# Patient Record
Sex: MALE | Race: WHITE | Employment: OTHER | URBAN - NONMETROPOLITAN AREA
[De-identification: names, ages, dates, MRNs, and addresses within clinical notes are randomized per-mention and may not be internally consistent; named-entity substitution may affect disease eponyms.]

---

## 2023-11-08 ENCOUNTER — HOSPITAL ENCOUNTER (INPATIENT)
Age: 88
LOS: 2 days | Discharge: HOME | End: 2023-11-10
Attending: STUDENT IN AN ORGANIZED HEALTH CARE EDUCATION/TRAINING PROGRAM
Payer: MEDICARE

## 2023-11-08 ENCOUNTER — APPOINTMENT (OUTPATIENT)
Age: 88
End: 2023-11-08
Attending: NUCLEAR MEDICINE
Payer: MEDICARE

## 2023-11-08 ENCOUNTER — APPOINTMENT (OUTPATIENT)
Dept: NUCLEAR MEDICINE | Age: 88
End: 2023-11-08
Attending: NUCLEAR MEDICINE
Payer: MEDICARE

## 2023-11-08 DIAGNOSIS — R07.9 CHEST PAIN: Primary | ICD-10-CM

## 2023-11-08 DIAGNOSIS — I20.0 UNSTABLE ANGINA (HCC): ICD-10-CM

## 2023-11-08 DIAGNOSIS — R94.39 ABNORMAL STRESS TEST: ICD-10-CM

## 2023-11-08 PROBLEM — R07.2 PRECORDIAL PAIN: Status: ACTIVE | Noted: 2023-11-08

## 2023-11-08 PROBLEM — R07.89 CHEST PRESSURE: Status: ACTIVE | Noted: 2023-11-08

## 2023-11-08 LAB
ALBUMIN SERPL BCG-MCNC: 3.6 G/DL (ref 3.5–5.1)
ALP SERPL-CCNC: 74 U/L (ref 38–126)
ALT SERPL W/O P-5'-P-CCNC: 21 U/L (ref 11–66)
ANION GAP SERPL CALC-SCNC: 13 MEQ/L (ref 8–16)
AST SERPL-CCNC: 19 U/L (ref 5–40)
BASOPHILS ABSOLUTE: 0 THOU/MM3 (ref 0–0.1)
BASOPHILS NFR BLD AUTO: 0.7 %
BILIRUB SERPL-MCNC: 0.5 MG/DL (ref 0.3–1.2)
BUN SERPL-MCNC: 23 MG/DL (ref 7–22)
CALCIUM SERPL-MCNC: 9.3 MG/DL (ref 8.5–10.5)
CHLORIDE SERPL-SCNC: 107 MEQ/L (ref 98–111)
CO2 SERPL-SCNC: 21 MEQ/L (ref 23–33)
CREAT SERPL-MCNC: 1.1 MG/DL (ref 0.4–1.2)
DEPRECATED RDW RBC AUTO: 45.2 FL (ref 35–45)
ECHO BSA: 2.19 M2
EKG ATRIAL RATE: 51 BPM
EKG P AXIS: 67 DEGREES
EKG P-R INTERVAL: 174 MS
EKG Q-T INTERVAL: 452 MS
EKG QRS DURATION: 92 MS
EKG QTC CALCULATION (BAZETT): 416 MS
EKG R AXIS: 79 DEGREES
EKG T AXIS: 73 DEGREES
EKG VENTRICULAR RATE: 51 BPM
EOSINOPHIL NFR BLD AUTO: 3.7 %
EOSINOPHILS ABSOLUTE: 0.3 THOU/MM3 (ref 0–0.4)
ERYTHROCYTE [DISTWIDTH] IN BLOOD BY AUTOMATED COUNT: 13.5 % (ref 11.5–14.5)
GFR SERPL CREATININE-BSD FRML MDRD: > 60 ML/MIN/1.73M2
GLUCOSE SERPL-MCNC: 118 MG/DL (ref 70–108)
HCT VFR BLD AUTO: 45.8 % (ref 42–52)
HGB BLD-MCNC: 15.4 GM/DL (ref 14–18)
IMM GRANULOCYTES # BLD AUTO: 0.01 THOU/MM3 (ref 0–0.07)
IMM GRANULOCYTES NFR BLD AUTO: 0.1 %
LYMPHOCYTES ABSOLUTE: 2 THOU/MM3 (ref 1–4.8)
LYMPHOCYTES NFR BLD AUTO: 28.4 %
MCH RBC QN AUTO: 30.8 PG (ref 26–33)
MCHC RBC AUTO-ENTMCNC: 33.6 GM/DL (ref 32.2–35.5)
MCV RBC AUTO: 91.6 FL (ref 80–94)
MONOCYTES ABSOLUTE: 0.8 THOU/MM3 (ref 0.4–1.3)
MONOCYTES NFR BLD AUTO: 11.2 %
NEUTROPHILS NFR BLD AUTO: 55.9 %
NRBC BLD AUTO-RTO: 0 /100 WBC
NUC REST EJECTION FRACTION: 53 %
NUC STRESS EJECTION FRACTION: 53 %
PLATELET # BLD AUTO: 147 THOU/MM3 (ref 130–400)
PMV BLD AUTO: 10.7 FL (ref 9.4–12.4)
POTASSIUM SERPL-SCNC: 4.2 MEQ/L (ref 3.5–5.2)
PROT SERPL-MCNC: 6.6 G/DL (ref 6.1–8)
RBC # BLD AUTO: 5 MILL/MM3 (ref 4.7–6.1)
SEGMENTED NEUTROPHILS ABSOLUTE COUNT: 4 THOU/MM3 (ref 1.8–7.7)
SODIUM SERPL-SCNC: 141 MEQ/L (ref 135–145)
STRESS BASELINE DIAS BP: 79 MMHG
STRESS BASELINE HR: 58 BPM
STRESS BASELINE SYS BP: 195 MMHG
STRESS STAGE 1 DURATION: 1 MIN:SEC
STRESS STAGE 1 HR: 64 BPM
STRESS STAGE 2 BP: NORMAL MMHG
STRESS STAGE 2 DURATION: 1 MIN:SEC
STRESS STAGE 2 HR: 78 BPM
STRESS STAGE 3 BP: NORMAL MMHG
STRESS STAGE 3 DURATION: 1 MIN:SEC
STRESS STAGE 3 HR: 74 BPM
STRESS STAGE RECOVERY 1 BP: NORMAL MMHG
STRESS STAGE RECOVERY 1 DURATION: 1 MIN:SEC
STRESS STAGE RECOVERY 1 HR: 74 BPM
STRESS STAGE RECOVERY 2 BP: NORMAL MMHG
STRESS STAGE RECOVERY 2 DURATION: 1 MIN:SEC
STRESS STAGE RECOVERY 2 HR: 73 BPM
STRESS STAGE RECOVERY 3 BP: NORMAL MMHG
STRESS STAGE RECOVERY 3 DURATION: 1 MIN:SEC
STRESS STAGE RECOVERY 3 HR: 68 BPM
STRESS STAGE RECOVERY 4 BP: NORMAL MMHG
STRESS STAGE RECOVERY 4 DURATION: 2 MIN:SEC
STRESS STAGE RECOVERY 4 HR: 71 BPM
STRESS TARGET HR: 134 BPM
TROPONIN, HIGH SENSITIVITY: 17 NG/L (ref 0–12)
TSH SERPL DL<=0.005 MIU/L-ACNC: 2.15 UIU/ML (ref 0.4–4.2)
WBC # BLD AUTO: 7.1 THOU/MM3 (ref 4.8–10.8)

## 2023-11-08 PROCEDURE — 2580000003 HC RX 258

## 2023-11-08 PROCEDURE — 85025 COMPLETE CBC W/AUTO DIFF WBC: CPT

## 2023-11-08 PROCEDURE — 84443 ASSAY THYROID STIM HORMONE: CPT

## 2023-11-08 PROCEDURE — 99223 1ST HOSP IP/OBS HIGH 75: CPT | Performed by: NUCLEAR MEDICINE

## 2023-11-08 PROCEDURE — 80053 COMPREHEN METABOLIC PANEL: CPT

## 2023-11-08 PROCEDURE — 93016 CV STRESS TEST SUPVJ ONLY: CPT | Performed by: NUCLEAR MEDICINE

## 2023-11-08 PROCEDURE — 36415 COLL VENOUS BLD VENIPUNCTURE: CPT

## 2023-11-08 PROCEDURE — 1200000003 HC TELEMETRY R&B

## 2023-11-08 PROCEDURE — 3430000000 HC RX DIAGNOSTIC RADIOPHARMACEUTICAL: Performed by: NUCLEAR MEDICINE

## 2023-11-08 PROCEDURE — 6360000002 HC RX W HCPCS

## 2023-11-08 PROCEDURE — 93010 ELECTROCARDIOGRAM REPORT: CPT | Performed by: INTERNAL MEDICINE

## 2023-11-08 PROCEDURE — 78452 HT MUSCLE IMAGE SPECT MULT: CPT

## 2023-11-08 PROCEDURE — 93017 CV STRESS TEST TRACING ONLY: CPT

## 2023-11-08 PROCEDURE — 93005 ELECTROCARDIOGRAM TRACING: CPT

## 2023-11-08 PROCEDURE — 84484 ASSAY OF TROPONIN QUANT: CPT

## 2023-11-08 PROCEDURE — 6360000002 HC RX W HCPCS: Performed by: NUCLEAR MEDICINE

## 2023-11-08 PROCEDURE — 93018 CV STRESS TEST I&R ONLY: CPT | Performed by: NUCLEAR MEDICINE

## 2023-11-08 PROCEDURE — 78452 HT MUSCLE IMAGE SPECT MULT: CPT | Performed by: NUCLEAR MEDICINE

## 2023-11-08 PROCEDURE — 99222 1ST HOSP IP/OBS MODERATE 55: CPT

## 2023-11-08 PROCEDURE — 6370000000 HC RX 637 (ALT 250 FOR IP)

## 2023-11-08 PROCEDURE — A9500 TC99M SESTAMIBI: HCPCS | Performed by: NUCLEAR MEDICINE

## 2023-11-08 RX ORDER — ONDANSETRON 4 MG/1
4 TABLET, ORALLY DISINTEGRATING ORAL EVERY 8 HOURS PRN
Status: DISCONTINUED | OUTPATIENT
Start: 2023-11-08 | End: 2023-11-10 | Stop reason: HOSPADM

## 2023-11-08 RX ORDER — ACETAMINOPHEN 650 MG/1
650 SUPPOSITORY RECTAL EVERY 6 HOURS PRN
Status: DISCONTINUED | OUTPATIENT
Start: 2023-11-08 | End: 2023-11-10 | Stop reason: HOSPADM

## 2023-11-08 RX ORDER — POLYETHYLENE GLYCOL 3350 17 G/17G
17 POWDER, FOR SOLUTION ORAL DAILY PRN
COMMUNITY

## 2023-11-08 RX ORDER — CLOPIDOGREL BISULFATE 75 MG/1
75 TABLET ORAL DAILY
Status: ON HOLD | COMMUNITY
End: 2023-11-10 | Stop reason: HOSPADM

## 2023-11-08 RX ORDER — POLYETHYLENE GLYCOL 3350 17 G/17G
17 POWDER, FOR SOLUTION ORAL DAILY PRN
Status: DISCONTINUED | OUTPATIENT
Start: 2023-11-08 | End: 2023-11-10 | Stop reason: HOSPADM

## 2023-11-08 RX ORDER — ATORVASTATIN CALCIUM 40 MG/1
40 TABLET, FILM COATED ORAL DAILY
Status: DISCONTINUED | OUTPATIENT
Start: 2023-11-08 | End: 2023-11-10 | Stop reason: HOSPADM

## 2023-11-08 RX ORDER — SODIUM CHLORIDE 9 MG/ML
INJECTION, SOLUTION INTRAVENOUS PRN
Status: DISCONTINUED | OUTPATIENT
Start: 2023-11-08 | End: 2023-11-10 | Stop reason: SDUPTHER

## 2023-11-08 RX ORDER — POTASSIUM CHLORIDE 20 MEQ/1
40 TABLET, EXTENDED RELEASE ORAL PRN
Status: DISCONTINUED | OUTPATIENT
Start: 2023-11-08 | End: 2023-11-10 | Stop reason: HOSPADM

## 2023-11-08 RX ORDER — CLOPIDOGREL BISULFATE 75 MG/1
75 TABLET ORAL DAILY
Status: DISCONTINUED | OUTPATIENT
Start: 2023-11-08 | End: 2023-11-09

## 2023-11-08 RX ORDER — TETRAKIS(2-METHOXYISOBUTYLISOCYANIDE)COPPER(I) TETRAFLUOROBORATE 1 MG/ML
34 INJECTION, POWDER, LYOPHILIZED, FOR SOLUTION INTRAVENOUS
Status: COMPLETED | OUTPATIENT
Start: 2023-11-08 | End: 2023-11-08

## 2023-11-08 RX ORDER — ATENOLOL 50 MG/1
50 TABLET ORAL DAILY
Status: DISCONTINUED | OUTPATIENT
Start: 2023-11-08 | End: 2023-11-10 | Stop reason: HOSPADM

## 2023-11-08 RX ORDER — ONDANSETRON 2 MG/ML
4 INJECTION INTRAMUSCULAR; INTRAVENOUS EVERY 6 HOURS PRN
Status: DISCONTINUED | OUTPATIENT
Start: 2023-11-08 | End: 2023-11-10 | Stop reason: HOSPADM

## 2023-11-08 RX ORDER — HYDRALAZINE HYDROCHLORIDE 25 MG/1
25 TABLET, FILM COATED ORAL 3 TIMES DAILY
COMMUNITY

## 2023-11-08 RX ORDER — PANTOPRAZOLE SODIUM 40 MG/1
40 TABLET, DELAYED RELEASE ORAL
Status: DISCONTINUED | OUTPATIENT
Start: 2023-11-08 | End: 2023-11-10 | Stop reason: HOSPADM

## 2023-11-08 RX ORDER — REGADENOSON 0.08 MG/ML
0.4 INJECTION, SOLUTION INTRAVENOUS
Status: COMPLETED | OUTPATIENT
Start: 2023-11-08 | End: 2023-11-08

## 2023-11-08 RX ORDER — SODIUM CHLORIDE 0.9 % (FLUSH) 0.9 %
5-40 SYRINGE (ML) INJECTION PRN
Status: DISCONTINUED | OUTPATIENT
Start: 2023-11-08 | End: 2023-11-10 | Stop reason: HOSPADM

## 2023-11-08 RX ORDER — SODIUM CHLORIDE 0.9 % (FLUSH) 0.9 %
5-40 SYRINGE (ML) INJECTION EVERY 12 HOURS SCHEDULED
Status: DISCONTINUED | OUTPATIENT
Start: 2023-11-08 | End: 2023-11-10 | Stop reason: HOSPADM

## 2023-11-08 RX ORDER — TETRAKIS(2-METHOXYISOBUTYLISOCYANIDE)COPPER(I) TETRAFLUOROBORATE 1 MG/ML
9.4 INJECTION, POWDER, LYOPHILIZED, FOR SOLUTION INTRAVENOUS
Status: COMPLETED | OUTPATIENT
Start: 2023-11-08 | End: 2023-11-08

## 2023-11-08 RX ORDER — VERAPAMIL HYDROCHLORIDE 240 MG/1
120 TABLET, FILM COATED, EXTENDED RELEASE ORAL DAILY
Status: DISCONTINUED | OUTPATIENT
Start: 2023-11-08 | End: 2023-11-10 | Stop reason: HOSPADM

## 2023-11-08 RX ORDER — MAGNESIUM SULFATE IN WATER 40 MG/ML
2000 INJECTION, SOLUTION INTRAVENOUS PRN
Status: DISCONTINUED | OUTPATIENT
Start: 2023-11-08 | End: 2023-11-10 | Stop reason: HOSPADM

## 2023-11-08 RX ORDER — POTASSIUM CHLORIDE 7.45 MG/ML
10 INJECTION INTRAVENOUS PRN
Status: DISCONTINUED | OUTPATIENT
Start: 2023-11-08 | End: 2023-11-10 | Stop reason: HOSPADM

## 2023-11-08 RX ORDER — ACETAMINOPHEN 325 MG/1
650 TABLET ORAL EVERY 6 HOURS PRN
Status: DISCONTINUED | OUTPATIENT
Start: 2023-11-08 | End: 2023-11-10 | Stop reason: HOSPADM

## 2023-11-08 RX ORDER — ASPIRIN 81 MG/1
81 TABLET ORAL DAILY
Status: DISCONTINUED | OUTPATIENT
Start: 2023-11-08 | End: 2023-11-10 | Stop reason: HOSPADM

## 2023-11-08 RX ORDER — DOCUSATE SODIUM 100 MG/1
100 CAPSULE, LIQUID FILLED ORAL EVERY OTHER DAY
Status: DISCONTINUED | OUTPATIENT
Start: 2023-11-08 | End: 2023-11-10 | Stop reason: HOSPADM

## 2023-11-08 RX ORDER — VITAMIN B COMPLEX
5000 TABLET ORAL DAILY
Status: DISCONTINUED | OUTPATIENT
Start: 2023-11-08 | End: 2023-11-10 | Stop reason: HOSPADM

## 2023-11-08 RX ORDER — HYDRALAZINE HYDROCHLORIDE 25 MG/1
25 TABLET, FILM COATED ORAL 3 TIMES DAILY
Status: DISCONTINUED | OUTPATIENT
Start: 2023-11-08 | End: 2023-11-10 | Stop reason: HOSPADM

## 2023-11-08 RX ADMIN — VERAPAMIL HYDROCHLORIDE 120 MG: 240 TABLET, FILM COATED, EXTENDED RELEASE ORAL at 13:54

## 2023-11-08 RX ADMIN — REGADENOSON 0.4 MG: 0.08 INJECTION, SOLUTION INTRAVENOUS at 12:27

## 2023-11-08 RX ADMIN — ASPIRIN 81 MG: 81 TABLET, COATED ORAL at 20:41

## 2023-11-08 RX ADMIN — Medication 9.4 MILLICURIE: at 11:44

## 2023-11-08 RX ADMIN — HYDRALAZINE HYDROCHLORIDE 25 MG: 25 TABLET, FILM COATED ORAL at 08:16

## 2023-11-08 RX ADMIN — HYDRALAZINE HYDROCHLORIDE 25 MG: 25 TABLET, FILM COATED ORAL at 13:54

## 2023-11-08 RX ADMIN — ATORVASTATIN CALCIUM 40 MG: 40 TABLET, FILM COATED ORAL at 20:41

## 2023-11-08 RX ADMIN — HYDRALAZINE HYDROCHLORIDE 25 MG: 25 TABLET, FILM COATED ORAL at 20:41

## 2023-11-08 RX ADMIN — Medication 5000 UNITS: at 08:17

## 2023-11-08 RX ADMIN — ATENOLOL 50 MG: 50 TABLET ORAL at 20:42

## 2023-11-08 RX ADMIN — DOCUSATE SODIUM 100 MG: 100 CAPSULE, LIQUID FILLED ORAL at 08:16

## 2023-11-08 RX ADMIN — SODIUM CHLORIDE, PRESERVATIVE FREE 10 ML: 5 INJECTION INTRAVENOUS at 08:44

## 2023-11-08 RX ADMIN — Medication 34 MILLICURIE: at 12:55

## 2023-11-08 RX ADMIN — SODIUM CHLORIDE, PRESERVATIVE FREE 10 ML: 5 INJECTION INTRAVENOUS at 20:42

## 2023-11-08 NOTE — CARE COORDINATION
Case Management Assessment  Initial Evaluation    Date/Time of Evaluation: 11/8/2023 11:33 AM  Assessment Completed by: Maddie Mederos RN    If patient is discharged prior to next notation, then this note serves as note for discharge by case management. Patient Name: Masood Pacheco                   YOB: 1935  Diagnosis: Chest pressure [R07.89]                   Date / Time: 11/8/2023 12:30 AM  Location: [address] [place] [address]     Patient Admission Status: Inpatient   Readmission Risk Low 0-14, Mod 15-19), High > 20: No data recorded  Current PCP: Ludmila Silverman  PCP verified by CM? Yes    Chart Reviewed: Yes      History Provided by: Patient  Patient Orientation: Alert and Oriented    Patient Cognition: Alert    Hospitalization in the last 30 days (Readmission):  No    If yes, Readmission Assessment in CM Navigator will be completed. Advance Directives:      Code Status: Full Code   Patient's Primary Decision Maker is: Legal Next of Kin      Discharge Planning:    Patient lives with: Spouse/Significant Other Type of Home: House  Primary Care Giver: Self  Patient Support Systems include: Spouse/Significant Other, Children   Current Financial resources: Medicare, Other (Comment) (and [facility])  Current community resources: None  Current services prior to admission: None            Current DME:              Type of Home Care services:  None    ADLS  Prior functional level: Independent in ADLs/IADLs  Current functional level: Independent in ADLs/IADLs    Family can provide assistance at DC: Yes  Would you like Case Management to discuss the discharge plan with any other family members/significant others, and if so, who?  No  Plans to Return to Present Housing: Yes  Other Identified Issues/Barriers to RETURNING to current housing: No  Potential Assistance needed at discharge: N/A            Potential DME:    Patient expects to discharge to: [address] [place] [address] for transportation at discharge: Self    Financial    Payor: MEDICARE / Plan: MEDICARE PART A AND B / Product Type: *No Product type* /     Does insurance require precert for SNF: No    Potential assistance Purchasing Medications: No  Meds-to-Beds request: Yes      [facility] [id number] - [place], [address] [facility] [address]  Rekha Broderick [address] [facility]  Phone: [phone] Fax: [phone]      Notes:    Factors facilitating achievement of predicted outcomes: Family support and Cooperative    Barriers to discharge: Medical complications    Additional Case Management Notes:      Procedure: Admitted from [facility] with SVT. Cardiology following. Planning Nuclear Stress. On Verapamil. The Plan for Transition of Care is related to the following treatment goals of Chest pressure [R07.89]    Patient Goals/Plan/Treatment Preferences:  Met with Tez Davila today. Spouse and 2 children are present. Pt resides with spouse. Independent. He is insured and has a PCP. Transportation/Food Security/Housekeeping Addressed: No issues identified.      Apoorva Vasquez RN  Case Management Department

## 2023-11-08 NOTE — PLAN OF CARE
72-year-old male admitted after midnight for chest pressure and rapid heart rate. Found to be in SVT. EP was consulted. Patient getting stress test today. Agree with H&P from same day.       Electronically signed by Lucia Harp PA-C on 11/8/2023 at 4:22 PM

## 2023-11-08 NOTE — H&P
Hospitalist History & Physical    Patient:  Violetta Jalloh    Unit/Bed:8B-34/034-A  YOB: 1935  MRN: [id number]   Acct: [de-identified]   PCP: Roberto Guevara MD  Code Status: Full Code    Date of Service: Pt seen/examined on 11/08/23 and admitted to Inpatient with expected LOS greater than two midnights due to medical therapy. Chief Complaint: Chest pressure    Assessment/Plan:    SVT: Had previously been controlled with Verapamil every other day and Atenolol. More frequent episodes post Covid. Required Adenosine administration during recent visit. (-) palpitations but (+) for chest pressure during these episodes. Resolves immediatly after conversion. Consult EP in AM.  Telemetry. Check BMP/TSH  Continue Atenolol and Verapamil-with hold parameters. CAD: Continue ASA/Statin. Hold Plavix pending EP consult. GERD: Continue PPI. CVA: No residual deficits. Continue ASA/Statin. HLD: Continue Statin. History of Present Illness:  Violetta Jalloh is a 82 y.o. male with PMHx of CAD, GERD, CVA, HLD who presented to [facility] with chief complaint of chest pressure and rapid heart rate. The patient reports that he has had intermittent episodes of SVT since the 80's. He follows down at [facility] cardiology. He states he was put on Verapamil and has been doing well but then he got Covid several months ago and since then, has began to have episodes of SVT again despite being compliant with his medication. He states that when these episodes occur, he has substernal chest pressure, that is when he knows his heart rate is elevated. He tries to use the vagal maneuver but has had little success. He states he has been to the emergency department 4x in the last week. He was given Adenosine at one visit and converted back to NSR. Other times, he will self convert without intervention.   During this recent admission to [facility], it was decided that given the increased

## 2023-11-08 NOTE — PLAN OF CARE
Problem: Safety - Adult  Goal: Free from fall injury  Outcome: Progressing   Patient call light within reach. Socks on. Overhead table within reach. Patient alert and oriented. Calls out appropriately.

## 2023-11-08 NOTE — PROGRESS NOTES
Spoke with Dr. Linda Foreman @ [facility] ER re; Starlet Dress. C/O chest pressure and rapid heart rate. HX- SVT. Diagnosed several years ago and doing well on Verapamil. Within the past few weeks he has been to ER 4X with episodes of SVT. He was admitted at one point and medications were changed. He was in their ER yesterday and given lopressor-converted to NSR and was sent home. Today he presented with SVT-130s and converted without intervention. He is being transferred here for possible ablation.     Now NSR @ 60  140/66, 95% RA    Labs=WNL    Accepted on behalf of hospitalist.

## 2023-11-08 NOTE — CONSULTS
[place] [address]                                  CONSULTATION    PATIENT NAME: Kimmie Booker                  :        1935  MED REC NO:   [id number]                           ROOM:       [id number]  ACCOUNT NO:   [de-identified]                           ADMIT DATE: 2023  PROVIDER:     ONUR James DATE:  2023    CARDIOLOGY CONSULTATION    REASON FOR CONSULTATION:  Chest discomfort and arrhythmia. REQUESTING PROVIDER:  Hospitalist Service. HISTORY OF PRESENT ILLNESS:  This is a pleasant 82year-old gentleman  with history of previous myocardial infarct and balloon angioplasty in  , who has not had any recent cardiac intervention and apparently has  been followed by a local cardiologist at [facility] for some arrhythmia  that he was not sure exactly what type, but he calls it a rapid  heartbeat. He has had intermittent palpitation, has had evaluation  lately for recurrent symptoms of rapid heartbeat with associated  discomfort. He presented to [facility] with those symptoms  and transferred to [facility] for further evaluation. REVIEW OF SYSTEMS:  No fever. No chills. No weight loss. No hematuria  or dysuria. No abdominal pain, nausea, vomiting, or diarrhea. No  obvious active psych problems or suicidal ideation. No skin rashes. No  obvious dizziness, lightheadedness, or loss of consciousness. No recent  trauma. No bleeding problem. No HEENT-related problems. PAST MEDICAL HISTORY:  1. Arrhythmia. 2.  Hypertension. 3.  Hyperlipidemia. 4.  Coronary artery disease. 5.  Acid reflux. 6.  Arthritis. ALLERGIES:  PROCARDIA AND MORPHINE. MEDICATIONS:  Lipitor 40 a day, atenolol 50 a day, hydrochlorothiazide  25 three times a day, Calan  a day, and Protonix 40 a day. SOCIAL HISTORY:  No tobacco.  No drugs. No alcohol.     FAMILY HISTORY: Significant for coronary artery disease. PHYSICAL EXAMINATION:  VITAL SIGNS:  Blood pressure 130/80 and heart rate of 70. GENERAL APPEARANCE:  A pleasant gentleman, in no obvious acute distress. EYES AND EARS:  No discharge. NECK:  No JVD. No bruits. No masses. LUNGS:  Decreased air entry. No crackles. No wheezes. HEART:  Normal S1 and S2. Systolic murmur grade 2/6. ABDOMEN:  Soft and nontender. Positive bowel sounds. No organomegaly. EXTREMITIES:  No significant edema. NEUROLOGIC:  Grossly intact. Alert and awake. No focal deficits. PSYCH:  No evidence of active psychosis. SKIN:  No rashes. LABORATORY DATA:  Sodium 142, potassium 4.2, BUN 23, and creatinine 1.1. White count 7.1, hemoglobin 15.4, hematocrit 45.8, and platelets 374. IMPRESSION:  This is a patient who comes in with the above presentation. heart score 8     RECOMMENDATIONS:  1. We will proceed with a stress test to evaluate for ischemia and  decide accordingly given his recurrent angina symptoms during the  episodes of tachycardia. 2.  We will need the records from [facility] to see exactly what kind of  rhythm he presented with as I do not have any documentation of any  specific rhythm. 3.  Depending on the rhythm strips and the findings, we will decide  further treatment. We will consider electrophysiology evaluation with  Dr. Meredith Siddiqui. In the meantime, once we assess the rhythm, we will decide  further management. We will consider antiarrhythmics, we will consider  long-term monitoring, and so on. 4.  Depending on the results of the stress test, we will decide  consideration of cardiac catheterization. Thank you for allowing me to participate in the care of this patient.         Kaushik Thomas M.D.    D: 11/08/2023 16:02:08       T: 11/08/2023 17:15:35     [email or web address]  Job#: [id number]     Doc#: [id number]    CC:

## 2023-11-09 ENCOUNTER — APPOINTMENT (OUTPATIENT)
Age: 88
End: 2023-11-09
Attending: STUDENT IN AN ORGANIZED HEALTH CARE EDUCATION/TRAINING PROGRAM
Payer: MEDICARE

## 2023-11-09 PROBLEM — I20.0 UNSTABLE ANGINA (HCC): Status: ACTIVE | Noted: 2023-11-09

## 2023-11-09 PROBLEM — R94.39 ABNORMAL STRESS TEST: Status: ACTIVE | Noted: 2023-11-07

## 2023-11-09 LAB
ACTIVATED CLOTTING TIME: 287 SECONDS (ref 1–150)
ACTIVATED CLOTTING TIME: 293 SECONDS (ref 1–150)
ALBUMIN SERPL BCG-MCNC: 4 G/DL (ref 3.5–5.1)
ALP SERPL-CCNC: 72 U/L (ref 38–126)
ALT SERPL W/O P-5'-P-CCNC: 19 U/L (ref 11–66)
ANION GAP SERPL CALC-SCNC: 9 MEQ/L (ref 8–16)
AST SERPL-CCNC: 20 U/L (ref 5–40)
BILIRUB CONJ SERPL-MCNC: < 0.2 MG/DL (ref 0–0.3)
BILIRUB SERPL-MCNC: 0.9 MG/DL (ref 0.3–1.2)
BUN SERPL-MCNC: 20 MG/DL (ref 7–22)
CALCIUM SERPL-MCNC: 9.4 MG/DL (ref 8.5–10.5)
CHLORIDE SERPL-SCNC: 103 MEQ/L (ref 98–111)
CO2 SERPL-SCNC: 24 MEQ/L (ref 23–33)
CREAT SERPL-MCNC: 1.2 MG/DL (ref 0.4–1.2)
DEPRECATED RDW RBC AUTO: 45.4 FL (ref 35–45)
ECHO AO ASC DIAM: 3.5 CM
ECHO AO ASCENDING AORTA INDEX: 1.65 CM/M2
ECHO AV AREA PEAK VELOCITY: 2.1 CM2
ECHO AV AREA VTI: 2 CM2
ECHO AV AREA/BSA PEAK VELOCITY: 1 CM2/M2
ECHO AV AREA/BSA VTI: 0.9 CM2/M2
ECHO AV CUSP MM: 1.1 CM
ECHO AV MEAN GRADIENT: 5 MMHG
ECHO AV MEAN VELOCITY: 1.1 M/S
ECHO AV PEAK GRADIENT: 10 MMHG
ECHO AV PEAK VELOCITY: 1.6 M/S
ECHO AV VELOCITY RATIO: 0.75
ECHO AV VTI: 39.5 CM
ECHO BSA: 2.19 M2
ECHO BSA: 2.19 M2
ECHO LA AREA 2C: 25.6 CM2
ECHO LA AREA 4C: 24.4 CM2
ECHO LA DIAMETER INDEX: 2.03 CM/M2
ECHO LA DIAMETER: 4.3 CM
ECHO LA MAJOR AXIS: 6.3 CM
ECHO LA MINOR AXIS: 6.5 CM
ECHO LA VOL BP: 79 ML (ref 18–58)
ECHO LA VOL MOD A2C: 81 ML (ref 18–58)
ECHO LA VOL MOD A4C: 75 ML (ref 18–58)
ECHO LA VOL/BSA BIPLANE: 37 ML/M2 (ref 16–34)
ECHO LA VOLUME INDEX MOD A2C: 38 ML/M2 (ref 16–34)
ECHO LA VOLUME INDEX MOD A4C: 35 ML/M2 (ref 16–34)
ECHO LV E' LATERAL VELOCITY: 7 CM/S
ECHO LV E' SEPTAL VELOCITY: 6 CM/S
ECHO LV EDV A2C: 90 ML
ECHO LV EDV A4C: 90 ML
ECHO LV EDV INDEX A4C: 42 ML/M2
ECHO LV EDV NDEX A2C: 42 ML/M2
ECHO LV EJECTION FRACTION A2C: 44 %
ECHO LV EJECTION FRACTION A4C: 47 %
ECHO LV EJECTION FRACTION BIPLANE: 45 % (ref 55–100)
ECHO LV ESV A2C: 50 ML
ECHO LV ESV A4C: 48 ML
ECHO LV ESV INDEX A2C: 24 ML/M2
ECHO LV ESV INDEX A4C: 23 ML/M2
ECHO LV FRACTIONAL SHORTENING: 26 % (ref 28–44)
ECHO LV INTERNAL DIMENSION DIASTOLE INDEX: 1.98 CM/M2
ECHO LV INTERNAL DIMENSION DIASTOLIC: 4.2 CM (ref 4.2–5.9)
ECHO LV INTERNAL DIMENSION SYSTOLIC INDEX: 1.46 CM/M2
ECHO LV INTERNAL DIMENSION SYSTOLIC: 3.1 CM
ECHO LV ISOVOLUMETRIC RELAXATION TIME (IVRT): 81 MS
ECHO LV IVSD: 1.1 CM (ref 0.6–1)
ECHO LV MASS 2D: 157.1 G (ref 88–224)
ECHO LV MASS INDEX 2D: 74.1 G/M2 (ref 49–115)
ECHO LV POSTERIOR WALL DIASTOLIC: 1.1 CM (ref 0.6–1)
ECHO LV RELATIVE WALL THICKNESS RATIO: 0.52
ECHO LVOT AREA: 2.8 CM2
ECHO LVOT AV VTI INDEX: 0.7
ECHO LVOT DIAM: 1.9 CM
ECHO LVOT MEAN GRADIENT: 3 MMHG
ECHO LVOT PEAK GRADIENT: 5 MMHG
ECHO LVOT PEAK VELOCITY: 1.2 M/S
ECHO LVOT STROKE VOLUME INDEX: 37 ML/M2
ECHO LVOT SV: 78.5 ML
ECHO LVOT VTI: 27.7 CM
ECHO MV A VELOCITY: 0.42 M/S
ECHO MV E DECELERATION TIME (DT): 216 MS
ECHO MV E VELOCITY: 0.95 M/S
ECHO MV E/A RATIO: 2.26
ECHO MV E/E' LATERAL: 13.57
ECHO MV REGURGITANT PEAK GRADIENT: 71 MMHG
ECHO MV REGURGITANT PEAK VELOCITY: 4.2 M/S
ECHO PV MAX VELOCITY: 0.8 M/S
ECHO PV PEAK GRADIENT: 3 MMHG
ECHO RV INTERNAL DIMENSION: 3 CM
ECHO RV TAPSE: 2 CM (ref 1.7–?)
ECHO TV E WAVE: 0.6 M/S
EKG ATRIAL RATE: 58 BPM
EKG P AXIS: 39 DEGREES
EKG P-R INTERVAL: 166 MS
EKG Q-T INTERVAL: 448 MS
EKG QRS DURATION: 94 MS
EKG QTC CALCULATION (BAZETT): 439 MS
EKG R AXIS: 42 DEGREES
EKG T AXIS: -69 DEGREES
EKG VENTRICULAR RATE: 58 BPM
ERYTHROCYTE [DISTWIDTH] IN BLOOD BY AUTOMATED COUNT: 13.6 % (ref 11.5–14.5)
GFR SERPL CREATININE-BSD FRML MDRD: 59 ML/MIN/1.73M2
GLUCOSE SERPL-MCNC: 128 MG/DL (ref 70–108)
HCT VFR BLD AUTO: 49.6 % (ref 42–52)
HGB BLD-MCNC: 16.6 GM/DL (ref 14–18)
INR PPP: 1.06 (ref 0.85–1.13)
MCH RBC QN AUTO: 30.7 PG (ref 26–33)
MCHC RBC AUTO-ENTMCNC: 33.5 GM/DL (ref 32.2–35.5)
MCV RBC AUTO: 91.9 FL (ref 80–94)
PLATELET # BLD AUTO: 142 THOU/MM3 (ref 130–400)
PMV BLD AUTO: 11 FL (ref 9.4–12.4)
POTASSIUM SERPL-SCNC: 4.2 MEQ/L (ref 3.5–5.2)
PROT SERPL-MCNC: 7.2 G/DL (ref 6.1–8)
RBC # BLD AUTO: 5.4 MILL/MM3 (ref 4.7–6.1)
SODIUM SERPL-SCNC: 136 MEQ/L (ref 135–145)
WBC # BLD AUTO: 8.3 THOU/MM3 (ref 4.8–10.8)

## 2023-11-09 PROCEDURE — 6370000000 HC RX 637 (ALT 250 FOR IP): Performed by: INTERNAL MEDICINE

## 2023-11-09 PROCEDURE — 93458 L HRT ARTERY/VENTRICLE ANGIO: CPT | Performed by: INTERNAL MEDICINE

## 2023-11-09 PROCEDURE — 4A023N7 MEASUREMENT OF CARDIAC SAMPLING AND PRESSURE, LEFT HEART, PERCUTANEOUS APPROACH: ICD-10-PCS | Performed by: INTERNAL MEDICINE

## 2023-11-09 PROCEDURE — C1887 CATHETER, GUIDING: HCPCS | Performed by: INTERNAL MEDICINE

## 2023-11-09 PROCEDURE — B2151ZZ FLUOROSCOPY OF LEFT HEART USING LOW OSMOLAR CONTRAST: ICD-10-PCS | Performed by: INTERNAL MEDICINE

## 2023-11-09 PROCEDURE — 6360000002 HC RX W HCPCS: Performed by: NUCLEAR MEDICINE

## 2023-11-09 PROCEDURE — 2580000003 HC RX 258: Performed by: INTERNAL MEDICINE

## 2023-11-09 PROCEDURE — C1874 STENT, COATED/COV W/DEL SYS: HCPCS | Performed by: INTERNAL MEDICINE

## 2023-11-09 PROCEDURE — 2580000003 HC RX 258: Performed by: NURSE PRACTITIONER

## 2023-11-09 PROCEDURE — C1769 GUIDE WIRE: HCPCS | Performed by: INTERNAL MEDICINE

## 2023-11-09 PROCEDURE — 99232 SBSQ HOSP IP/OBS MODERATE 35: CPT | Performed by: NURSE PRACTITIONER

## 2023-11-09 PROCEDURE — 6360000004 HC RX CONTRAST MEDICATION: Performed by: INTERNAL MEDICINE

## 2023-11-09 PROCEDURE — 027136Z DILATION OF CORONARY ARTERY, TWO ARTERIES WITH THREE DRUG-ELUTING INTRALUMINAL DEVICES, PERCUTANEOUS APPROACH: ICD-10-PCS | Performed by: INTERNAL MEDICINE

## 2023-11-09 PROCEDURE — 92928 PRQ TCAT PLMT NTRAC ST 1 LES: CPT | Performed by: INTERNAL MEDICINE

## 2023-11-09 PROCEDURE — 36415 COLL VENOUS BLD VENIPUNCTURE: CPT

## 2023-11-09 PROCEDURE — 92921 PR PRQ TRLUML CORONARY ANGIOPLASTY ADDL BRANCH: CPT | Performed by: INTERNAL MEDICINE

## 2023-11-09 PROCEDURE — 93010 ELECTROCARDIOGRAM REPORT: CPT | Performed by: INTERNAL MEDICINE

## 2023-11-09 PROCEDURE — 85347 COAGULATION TIME ACTIVATED: CPT

## 2023-11-09 PROCEDURE — 99152 MOD SED SAME PHYS/QHP 5/>YRS: CPT | Performed by: INTERNAL MEDICINE

## 2023-11-09 PROCEDURE — 85027 COMPLETE CBC AUTOMATED: CPT

## 2023-11-09 PROCEDURE — 6360000002 HC RX W HCPCS: Performed by: INTERNAL MEDICINE

## 2023-11-09 PROCEDURE — 92920 PRQ TRLUML C ANGIOP 1ART&/BR: CPT | Performed by: INTERNAL MEDICINE

## 2023-11-09 PROCEDURE — 2500000003 HC RX 250 WO HCPCS: Performed by: INTERNAL MEDICINE

## 2023-11-09 PROCEDURE — 2709999900 HC NON-CHARGEABLE SUPPLY: Performed by: INTERNAL MEDICINE

## 2023-11-09 PROCEDURE — 85610 PROTHROMBIN TIME: CPT

## 2023-11-09 PROCEDURE — 80048 BASIC METABOLIC PNL TOTAL CA: CPT

## 2023-11-09 PROCEDURE — B2111ZZ FLUOROSCOPY OF MULTIPLE CORONARY ARTERIES USING LOW OSMOLAR CONTRAST: ICD-10-PCS | Performed by: INTERNAL MEDICINE

## 2023-11-09 PROCEDURE — C9600 PERC DRUG-EL COR STENT SING: HCPCS | Performed by: INTERNAL MEDICINE

## 2023-11-09 PROCEDURE — 93005 ELECTROCARDIOGRAM TRACING: CPT | Performed by: INTERNAL MEDICINE

## 2023-11-09 PROCEDURE — 2580000003 HC RX 258

## 2023-11-09 PROCEDURE — 6370000000 HC RX 637 (ALT 250 FOR IP)

## 2023-11-09 PROCEDURE — C1725 CATH, TRANSLUMIN NON-LASER: HCPCS | Performed by: INTERNAL MEDICINE

## 2023-11-09 PROCEDURE — 99153 MOD SED SAME PHYS/QHP EA: CPT | Performed by: INTERNAL MEDICINE

## 2023-11-09 PROCEDURE — 93306 TTE W/DOPPLER COMPLETE: CPT | Performed by: NUCLEAR MEDICINE

## 2023-11-09 PROCEDURE — 6370000000 HC RX 637 (ALT 250 FOR IP): Performed by: PHYSICIAN ASSISTANT

## 2023-11-09 PROCEDURE — 80076 HEPATIC FUNCTION PANEL: CPT

## 2023-11-09 PROCEDURE — C1894 INTRO/SHEATH, NON-LASER: HCPCS | Performed by: INTERNAL MEDICINE

## 2023-11-09 PROCEDURE — 1200000003 HC TELEMETRY R&B

## 2023-11-09 PROCEDURE — 93306 TTE W/DOPPLER COMPLETE: CPT

## 2023-11-09 RX ORDER — SODIUM CHLORIDE 9 MG/ML
INJECTION, SOLUTION INTRAVENOUS CONTINUOUS
Status: DISCONTINUED | OUTPATIENT
Start: 2023-11-09 | End: 2023-11-10 | Stop reason: HOSPADM

## 2023-11-09 RX ORDER — DICYCLOMINE HYDROCHLORIDE 10 MG/1
20 CAPSULE ORAL 4 TIMES DAILY PRN
Status: DISCONTINUED | OUTPATIENT
Start: 2023-11-09 | End: 2023-11-10 | Stop reason: HOSPADM

## 2023-11-09 RX ORDER — SIMETHICONE 80 MG
80 TABLET,CHEWABLE ORAL EVERY 6 HOURS PRN
Status: DISCONTINUED | OUTPATIENT
Start: 2023-11-09 | End: 2023-11-10 | Stop reason: HOSPADM

## 2023-11-09 RX ORDER — HEPARIN SODIUM 1000 [USP'U]/ML
INJECTION, SOLUTION INTRAVENOUS; SUBCUTANEOUS PRN
Status: DISCONTINUED | OUTPATIENT
Start: 2023-11-09 | End: 2023-11-09

## 2023-11-09 RX ORDER — FENTANYL CITRATE 50 UG/ML
INJECTION, SOLUTION INTRAMUSCULAR; INTRAVENOUS PRN
Status: DISCONTINUED | OUTPATIENT
Start: 2023-11-09 | End: 2023-11-09 | Stop reason: HOSPADM

## 2023-11-09 RX ORDER — NITROGLYCERIN 20 MG/100ML
5-200 INJECTION INTRAVENOUS CONTINUOUS
Status: DISCONTINUED | OUTPATIENT
Start: 2023-11-09 | End: 2023-11-10

## 2023-11-09 RX ORDER — ACETAMINOPHEN 325 MG/1
650 TABLET ORAL EVERY 4 HOURS PRN
Status: DISCONTINUED | OUTPATIENT
Start: 2023-11-09 | End: 2023-11-09 | Stop reason: SDUPTHER

## 2023-11-09 RX ORDER — HYDRALAZINE HYDROCHLORIDE 20 MG/ML
10 INJECTION INTRAMUSCULAR; INTRAVENOUS ONCE
Status: COMPLETED | OUTPATIENT
Start: 2023-11-09 | End: 2023-11-09

## 2023-11-09 RX ORDER — SODIUM CHLORIDE 0.9 % (FLUSH) 0.9 %
5-40 SYRINGE (ML) INJECTION PRN
Status: DISCONTINUED | OUTPATIENT
Start: 2023-11-09 | End: 2023-11-10 | Stop reason: SDUPTHER

## 2023-11-09 RX ORDER — MIDAZOLAM HYDROCHLORIDE 1 MG/ML
INJECTION INTRAMUSCULAR; INTRAVENOUS PRN
Status: DISCONTINUED | OUTPATIENT
Start: 2023-11-09 | End: 2023-11-09 | Stop reason: HOSPADM

## 2023-11-09 RX ORDER — SODIUM CHLORIDE 9 MG/ML
INJECTION, SOLUTION INTRAVENOUS CONTINUOUS
Status: DISCONTINUED | OUTPATIENT
Start: 2023-11-09 | End: 2023-11-09

## 2023-11-09 RX ORDER — SODIUM CHLORIDE 0.9 % (FLUSH) 0.9 %
5-40 SYRINGE (ML) INJECTION EVERY 12 HOURS SCHEDULED
Status: DISCONTINUED | OUTPATIENT
Start: 2023-11-09 | End: 2023-11-10 | Stop reason: SDUPTHER

## 2023-11-09 RX ORDER — NITROGLYCERIN 0.4 MG/1
TABLET SUBLINGUAL PRN
Status: DISCONTINUED | OUTPATIENT
Start: 2023-11-09 | End: 2023-11-09 | Stop reason: HOSPADM

## 2023-11-09 RX ORDER — SODIUM CHLORIDE 9 MG/ML
INJECTION, SOLUTION INTRAVENOUS PRN
Status: DISCONTINUED | OUTPATIENT
Start: 2023-11-09 | End: 2023-11-10 | Stop reason: HOSPADM

## 2023-11-09 RX ADMIN — NITROGLYCERIN 10 MCG/MIN: 20 INJECTION INTRAVENOUS at 15:44

## 2023-11-09 RX ADMIN — ASPIRIN 81 MG: 81 TABLET, COATED ORAL at 09:51

## 2023-11-09 RX ADMIN — PANTOPRAZOLE SODIUM 40 MG: 40 TABLET, DELAYED RELEASE ORAL at 05:08

## 2023-11-09 RX ADMIN — SODIUM CHLORIDE: 9 INJECTION, SOLUTION INTRAVENOUS at 23:46

## 2023-11-09 RX ADMIN — VERAPAMIL HYDROCHLORIDE 120 MG: 240 TABLET, FILM COATED, EXTENDED RELEASE ORAL at 07:50

## 2023-11-09 RX ADMIN — ACETAMINOPHEN 650 MG: 325 TABLET ORAL at 21:03

## 2023-11-09 RX ADMIN — SIMETHICONE 80 MG: 80 TABLET, CHEWABLE ORAL at 16:37

## 2023-11-09 RX ADMIN — Medication 5000 UNITS: at 07:50

## 2023-11-09 RX ADMIN — SODIUM CHLORIDE: 9 INJECTION, SOLUTION INTRAVENOUS at 11:09

## 2023-11-09 RX ADMIN — ASPIRIN 81 MG: 81 TABLET, COATED ORAL at 21:03

## 2023-11-09 RX ADMIN — ATENOLOL 50 MG: 50 TABLET ORAL at 21:04

## 2023-11-09 RX ADMIN — DICYCLOMINE HYDROCHLORIDE 20 MG: 10 CAPSULE ORAL at 18:07

## 2023-11-09 RX ADMIN — SIMETHICONE 80 MG: 80 TABLET, CHEWABLE ORAL at 22:05

## 2023-11-09 RX ADMIN — TICAGRELOR 90 MG: 90 TABLET ORAL at 23:41

## 2023-11-09 RX ADMIN — HYDRALAZINE HYDROCHLORIDE 10 MG: 20 INJECTION, SOLUTION INTRAMUSCULAR; INTRAVENOUS at 18:30

## 2023-11-09 RX ADMIN — ATORVASTATIN CALCIUM 40 MG: 40 TABLET, FILM COATED ORAL at 21:03

## 2023-11-09 RX ADMIN — CLOPIDOGREL BISULFATE 75 MG: 75 TABLET ORAL at 09:51

## 2023-11-09 RX ADMIN — HYDRALAZINE HYDROCHLORIDE 25 MG: 25 TABLET, FILM COATED ORAL at 13:44

## 2023-11-09 RX ADMIN — HYDRALAZINE HYDROCHLORIDE 25 MG: 25 TABLET, FILM COATED ORAL at 07:50

## 2023-11-09 RX ADMIN — SODIUM CHLORIDE, PRESERVATIVE FREE 10 ML: 5 INJECTION INTRAVENOUS at 07:50

## 2023-11-09 RX ADMIN — ACETAMINOPHEN 650 MG: 325 TABLET ORAL at 14:52

## 2023-11-09 ASSESSMENT — PAIN - FUNCTIONAL ASSESSMENT
PAIN_FUNCTIONAL_ASSESSMENT: ACTIVITIES ARE NOT PREVENTED
PAIN_FUNCTIONAL_ASSESSMENT: ACTIVITIES ARE NOT PREVENTED

## 2023-11-09 ASSESSMENT — PAIN DESCRIPTION - ORIENTATION
ORIENTATION: MID
ORIENTATION: MID

## 2023-11-09 ASSESSMENT — PAIN DESCRIPTION - LOCATION
LOCATION: BACK
LOCATION: BACK
LOCATION: CHEST

## 2023-11-09 ASSESSMENT — PAIN DESCRIPTION - FREQUENCY: FREQUENCY: INTERMITTENT

## 2023-11-09 ASSESSMENT — PAIN SCALES - GENERAL
PAINLEVEL_OUTOF10: 2
PAINLEVEL_OUTOF10: 5
PAINLEVEL_OUTOF10: 2
PAINLEVEL_OUTOF10: 5
PAINLEVEL_OUTOF10: 0
PAINLEVEL_OUTOF10: 5

## 2023-11-09 ASSESSMENT — PAIN DESCRIPTION - DESCRIPTORS
DESCRIPTORS: ACHING
DESCRIPTORS: ACHING

## 2023-11-09 ASSESSMENT — PAIN DESCRIPTION - PAIN TYPE: TYPE: ACUTE PAIN

## 2023-11-09 ASSESSMENT — PAIN DESCRIPTION - ONSET: ONSET: GRADUAL

## 2023-11-09 NOTE — PROGRESS NOTES
Hospitalist Progress Note    Patient:  Maddi Delacruz    YOB: 1935  Unit/Bed:8B-34/034-A  Date of Admission: 11/8/2023      Assessment/Plan:    Unstable Angina:   Cardiology consulted. Echo 11/9 EF 45-50%. Abnormal stress 11/8. Plan for heart cath today, 11/9. Continue ASA, Brilinta, statin, BB.     SVT:   More frequent post covid. Continue atenolol and verapamil. Cardiology following. Tele. GERD: PPI    CVA: No residual deficits. Continue ASA/Statin. Chief Complaint: Chest pressure    HPI / Hospital Course: Per HPI: Maddi Delacruz is a 82 y.o. male with PMHx of CAD, GERD, CVA, HLD who presented to [facility] with chief complaint of chest pressure and rapid heart rate. The patient reports that he has had intermittent episodes of SVT since the 80's. He follows down at [facility] cardiology. He states he was put on Verapamil and has been doing well but then he got Covid several months ago and since then, has began to have episodes of SVT again despite being compliant with his medication. He states that when these episodes occur, he has substernal chest pressure, that is when he knows his heart rate is elevated. He tries to use the vagal maneuver but has had little success. He states he has been to the emergency department 4x in the last week. He was given Adenosine at one visit and converted back to NSR. Other times, he will self convert without intervention. During this recent admission to [facility], it was decided that given the increased frequency to which the episodes have been occurring, the patient should be transferred to [facility] for possible ablation. \"     Subjective (past 24 hours): Pt seen prior to heart cath. No fever/chills, SOB, CP, abd pain, NVD. No further chest pain. ROS: Pertinent positives as noted in HPI. All other systems reviewed and negative.       Past medical history, family history, social history and allergies reviewed again and is unchanged

## 2023-11-09 NOTE — PLAN OF CARE
Problem: Discharge Planning  Goal: Discharge to home or other facility with appropriate resources  Outcome: Progressing     Problem: ABCDS Injury Assessment  Goal: Absence of physical injury  Outcome: Progressing     Problem: Safety - Adult  Goal: Free from fall injury  11/8/2023 2143 by Deena Blanca RN  Outcome: Progressing  11/8/2023 1011 by Adali Conklin RN  Outcome: Progressing   Care plan reviewed with patient. Patient verbalize understanding of the plan of care and contribute to goal setting.

## 2023-11-09 NOTE — H&P
further questions and wished to proceed; the consent form was signed. MEDICAL HISTORY  []ASHD/ANGINA/MI/CHF   []Hypertension  []Diabetes  []Hyperlipidemia  []Smoking  []Family Hx of ASHD  []Additional information:       has a past medical history of Bladder cancer, Hypercholesteremia, and Tachycardia. SURGICAL HISTORY   has a past surgical history that includes Bladder removal (12/21/09); Coronary angioplasty with stent (1985); and Prostate surgery (2009).   Additional information:       ALLERGIES   Allergies as of 11/07/2023 - Review Complete 11/07/2013   Allergen Reaction Noted    Procardia [nifedipine]  12/06/2011     Additional information:       MEDICATIONS   Aspirin  [] 81 mg  [] 325 mg  [] None  Antiplatelet drug therapy use last 5 days  []No []Yes  Coumadin Use Last 5 Days []No []Yes  Other anticoagulant use last 5 days  []No []Yes    Current Facility-Administered Medications:     0.9 % sodium chloride infusion, , IntraVENous, Continuous, Jemima Martini APRN - CNP, Last Rate: 50 mL/hr at 11/09/23 1109, New Bag at 11/09/23 1109    sodium chloride flush 0.9 % injection 5-40 mL, 5-40 mL, IntraVENous, 2 times per day, Longmeier, Sydni A, APRN - CNP, 10 mL at 11/09/23 0750    sodium chloride flush 0.9 % injection 5-40 mL, 5-40 mL, IntraVENous, PRN, Longmeier, Sydni A, APRN - CNP    0.9 % sodium chloride infusion, , IntraVENous, PRN, Longmeier, Sydni A, APRN - CNP    potassium chloride (KLOR-CON M) extended release tablet 40 mEq, 40 mEq, Oral, PRN **OR** potassium bicarb-citric acid (EFFER-K) effervescent tablet 40 mEq, 40 mEq, Oral, PRN **OR** potassium chloride 10 mEq/100 mL IVPB (Peripheral Line), 10 mEq, IntraVENous, PRN, Longmeier, Sydni A, APRN - CNP    magnesium sulfate 2000 mg in 50 mL IVPB premix, 2,000 mg, IntraVENous, PRN, Longmeier, Sydni A, APRN - CNP    ondansetron (ZOFRAN-ODT) disintegrating tablet 4 mg, 4 mg, Oral, Q8H PRN **OR** ondansetron (ZOFRAN) injection 4 mg, 4 mg, IntraVENous, Q6H PRN, Sydni Patel APRN - CNP    polyethylene glycol (GLYCOLAX) packet 17 g, 17 g, Oral, Daily PRN, Sydni Patel APRN - CNP    acetaminophen (TYLENOL) tablet 650 mg, 650 mg, Oral, Q6H PRN **OR** acetaminophen (TYLENOL) suppository 650 mg, 650 mg, Rectal, Q6H PRN, Sydni Patel APRN - CNP    aspirin EC tablet 81 mg, 81 mg, Oral, Daily, Sydni Patel, APRN - CNP, 81 mg at 11/09/23 0951    atenolol (TENORMIN) tablet 50 mg, 50 mg, Oral, Daily, Sydni Patel APRANA ROSA - CNP, 50 mg at 11/08/23 2042    atorvastatin (LIPITOR) tablet 40 mg, 40 mg, Oral, Daily, Sydni Patel APRN - CNP, 40 mg at 11/08/23 2041    clopidogrel (PLAVIX) tablet 75 mg, 75 mg, Oral, Daily, Sydni Patel APRN - CNP, 75 mg at 11/09/23 0951    docusate sodium (COLACE) capsule 100 mg, 100 mg, Oral, Every Other Day, Sydni Patel APRN - CNP, 100 mg at 11/08/23 0816    hydrALAZINE (APRESOLINE) tablet 25 mg, 25 mg, Oral, TID, Sydni Patel APRN - CNP, 25 mg at 11/09/23 0750    pantoprazole (PROTONIX) tablet 40 mg, 40 mg, Oral, QAM AC, Sydni Patel, APRN - CNP, 40 mg at 11/09/23 0508    polyethylene glycol (GLYCOLAX) packet 17 g, 17 g, Oral, Daily PRN, Sydni Patel APRN - CNP    verapamil (CALAN SR) extended release tablet 120 mg, 120 mg, Oral, Daily, Frances Patele TARI, APRN - CNP, 120 mg at 11/09/23 0750    Vitamin D (CHOLECALCIFEROL) tablet 5,000 Units, 5,000 Units, Oral, Daily, Sydni Patel APRN - CNP, 5,000 Units at 11/09/23 0750  Prior to Admission medications    Medication Sig Start Date End Date Taking?  Authorizing Provider   clopidogrel (PLAVIX) 75 MG tablet Take 1 tablet by mouth daily   Yes ProviderReanna MD   hydrALAZINE (APRESOLINE) 25 MG tablet Take 1 tablet by mouth 3 times daily   Yes Provider, MD Reanna   polyethylene glycol (GLYCOLAX) 17 g packet Take 1 packet by mouth daily as needed for Constipation   Yes Provider, MD Reanna   vitamin D

## 2023-11-10 VITALS
DIASTOLIC BLOOD PRESSURE: 72 MMHG | RESPIRATION RATE: 16 BRPM | WEIGHT: 212.74 LBS | HEIGHT: 69 IN | TEMPERATURE: 98 F | HEART RATE: 55 BPM | OXYGEN SATURATION: 94 % | BODY MASS INDEX: 31.51 KG/M2 | SYSTOLIC BLOOD PRESSURE: 127 MMHG

## 2023-11-10 LAB
ANION GAP SERPL CALC-SCNC: 12 MEQ/L (ref 8–16)
BUN SERPL-MCNC: 19 MG/DL (ref 7–22)
CALCIUM SERPL-MCNC: 8.8 MG/DL (ref 8.5–10.5)
CHLORIDE SERPL-SCNC: 104 MEQ/L (ref 98–111)
CO2 SERPL-SCNC: 22 MEQ/L (ref 23–33)
CREAT SERPL-MCNC: 1.1 MG/DL (ref 0.4–1.2)
GFR SERPL CREATININE-BSD FRML MDRD: > 60 ML/MIN/1.73M2
GLUCOSE SERPL-MCNC: 119 MG/DL (ref 70–108)
POTASSIUM SERPL-SCNC: 4.3 MEQ/L (ref 3.5–5.2)
SODIUM SERPL-SCNC: 138 MEQ/L (ref 135–145)

## 2023-11-10 PROCEDURE — 36415 COLL VENOUS BLD VENIPUNCTURE: CPT

## 2023-11-10 PROCEDURE — 6370000000 HC RX 637 (ALT 250 FOR IP)

## 2023-11-10 PROCEDURE — 99232 SBSQ HOSP IP/OBS MODERATE 35: CPT | Performed by: STUDENT IN AN ORGANIZED HEALTH CARE EDUCATION/TRAINING PROGRAM

## 2023-11-10 PROCEDURE — 6370000000 HC RX 637 (ALT 250 FOR IP): Performed by: PHYSICIAN ASSISTANT

## 2023-11-10 PROCEDURE — 6370000000 HC RX 637 (ALT 250 FOR IP): Performed by: INTERNAL MEDICINE

## 2023-11-10 PROCEDURE — 99238 HOSP IP/OBS DSCHRG MGMT 30/<: CPT | Performed by: PHYSICIAN ASSISTANT

## 2023-11-10 PROCEDURE — 80048 BASIC METABOLIC PNL TOTAL CA: CPT

## 2023-11-10 RX ORDER — NITROGLYCERIN 0.4 MG/1
0.4 TABLET SUBLINGUAL EVERY 5 MIN PRN
Qty: 25 TABLET | Refills: 3 | Status: SHIPPED | OUTPATIENT
Start: 2023-11-10

## 2023-11-10 RX ADMIN — HYDRALAZINE HYDROCHLORIDE 25 MG: 25 TABLET, FILM COATED ORAL at 07:54

## 2023-11-10 RX ADMIN — TICAGRELOR 90 MG: 90 TABLET ORAL at 07:54

## 2023-11-10 RX ADMIN — PANTOPRAZOLE SODIUM 40 MG: 40 TABLET, DELAYED RELEASE ORAL at 05:43

## 2023-11-10 RX ADMIN — DICYCLOMINE HYDROCHLORIDE 20 MG: 10 CAPSULE ORAL at 07:54

## 2023-11-10 RX ADMIN — Medication 5000 UNITS: at 07:54

## 2023-11-10 RX ADMIN — SIMETHICONE 80 MG: 80 TABLET, CHEWABLE ORAL at 07:54

## 2023-11-10 RX ADMIN — DOCUSATE SODIUM 100 MG: 100 CAPSULE, LIQUID FILLED ORAL at 07:54

## 2023-11-10 ASSESSMENT — PAIN DESCRIPTION - LOCATION
LOCATION: ABDOMEN
LOCATION: BACK

## 2023-11-10 ASSESSMENT — PAIN DESCRIPTION - FREQUENCY
FREQUENCY: INTERMITTENT
FREQUENCY: CONTINUOUS

## 2023-11-10 ASSESSMENT — PAIN DESCRIPTION - DIRECTION: RADIATING_TOWARDS: BACK

## 2023-11-10 ASSESSMENT — PAIN SCALES - GENERAL
PAINLEVEL_OUTOF10: 3
PAINLEVEL_OUTOF10: 4

## 2023-11-10 ASSESSMENT — PAIN DESCRIPTION - PAIN TYPE
TYPE: ACUTE PAIN
TYPE: ACUTE PAIN

## 2023-11-10 ASSESSMENT — PAIN DESCRIPTION - ORIENTATION
ORIENTATION: MID
ORIENTATION: MID

## 2023-11-10 ASSESSMENT — PAIN DESCRIPTION - DESCRIPTORS
DESCRIPTORS: ACHING
DESCRIPTORS: ACHING

## 2023-11-10 ASSESSMENT — PAIN DESCRIPTION - ONSET: ONSET: ON-GOING

## 2023-11-10 NOTE — PROGRESS NOTES
Patient received pamphlet about cardiac intervention, how to take care of the incision site, mended hearts program, cardiac rehab and the hours of operations, and Nutritional information regarding cardiac diet. MI teaching done reviewing causes, signs symptoms, and risk factors. Discharge teaching and instructions for diagnosis/procedure of chest pressure/caridac stents completed with patient using teachback method. AVS reviewed. Printed prescriptions given to patient. Patient voiced understanding regarding prescriptions, follow up appointments, and care of self at home.  Discharged in a wheelchair to  home with support per family

## 2023-11-10 NOTE — PROGRESS NOTES
Inpatient Cardiac Rehabilitation Consult    Received consult for Phase II Cardiac Rehabilitation. Patient needs cardiac rehab due to PCI on 11/9/23. Importance of Cardiac Rehab discussed with patient. Reviewed cardiac rehab class times. Patient questions answered. We will contact patient at home to schedule evaluation appointment. Cardiac Rehab brochure given.

## 2023-11-10 NOTE — PLAN OF CARE
Problem: Discharge Planning  Goal: Discharge to home or other facility with appropriate resources  Outcome: Progressing     Problem: ABCDS Injury Assessment  Goal: Absence of physical injury  Outcome: Progressing     Problem: Safety - Adult  Goal: Free from fall injury  Outcome: Progressing     Problem: Pain  Goal: Verbalizes/displays adequate comfort level or baseline comfort level  Outcome: Progressing   Care plan reviewed with patient. Patient verbalize understanding of the plan of care and contribute to goal setting.

## 2023-11-10 NOTE — CARE COORDINATION
11/10/23, 11:56 AM EST    Patient goals/plan/ treatment preferences discussed by  and . Patient goals/plan/ treatment preferences reviewed with patient/ family. Patient/ family verbalize understanding of discharge plan and are in agreement with goal/plan/treatment preferences. Understanding was demonstrated using the teach back method. AVS provided by RN at time of discharge, which includes all necessary medical information pertaining to the patients current course of illness, treatment, post-discharge goals of care, and treatment preferences.      Services At/After Discharge: None       IMM Letter  IMM Letter given to Patient/Family/Significant other/Guardian/POA/by[de-identified] pt access  IMM Letter date given[de-identified] 11/08/23  IMM Letter time given[de-identified] 2006

## 2023-11-10 NOTE — PROGRESS NOTES
Practitioner mutually agreed upon goal:   Patient and provider goals: Feel better and have more energy and Start exercise program      Cardiology will see PRN. Call with any further questinos or concerns. F/u with Dr Cally Ruiz in 2-3 weeks at d/c.      Electronically signed by Danelle García PA-C on 11/10/2023 at 8:38 AM

## 2023-11-10 NOTE — DISCHARGE INSTRUCTIONS
F/u with Dr Ora Sanchez as scheduled. Do not stop brilinta or aspirin without talking to your doctor. Discharge Instructions for Radial Heart Catherization    1. Take it easy for 3-4 days. 2.  No driving for 2 days. 3.  No lifting of 5 lbs or more for 5 days with the affected arm. 4.  Can shower after 24 hours. 5.  Remove arm board after 24 hours. 6.  Apply a band aid to the insertion site daily for 5 days. May apply antibiotic ointment if desired, but not necessary. Wash site daily with soap and water. 7.  No creams, ointments, or powders near the insertion site. 8.  No tub baths, swimming, hot tubs, or hand washing dishes for 1 week. 9.  Watch for signs of infection (redness, warmth, swelling, or pus drainage) or coolness of extremity and call physician if this occurs  10. If bleeding occurs from insertion site, apply pressure and call 911.

## 2023-11-11 NOTE — DISCHARGE SUMMARY
Hospitalist Discharge Summary    Patient: Yvonne Ellis  YOB: 1935  MRN: [id number]   Acct: [de-identified]    Primary Care Physician: Stephenie Agudelo date  11/8/2023    Discharge date: 11/10/2023     Chief Complaint on presentation: Chest pain    Initial H&P / Hospital Course: Per HPI: Yvonne Ellis is a 82 y.o. male with PMHx of CAD, GERD, CVA, HLD who presented to [facility] with chief complaint of chest pressure and rapid heart rate. The patient reports that he has had intermittent episodes of SVT since the 80's. He follows down at [facility] cardiology. He states he was put on Verapamil and has been doing well but then he got Covid several months ago and since then, has began to have episodes of SVT again despite being compliant with his medication. He states that when these episodes occur, he has substernal chest pressure, that is when he knows his heart rate is elevated. He tries to use the vagal maneuver but has had little success. He states he has been to the emergency department 4x in the last week. He was given Adenosine at one visit and converted back to NSR. Other times, he will self convert without intervention. During this recent admission to [facility], it was decided that given the increased frequency to which the episodes have been occurring, the patient should be transferred to [facility] for possible ablation. \"     Cardiology was consulted. Patient had left heart cath on 11/9 with severe mid LAD, successful PCI/DONOVAN. Patient has been chest pain-free, no shortness of breath, lightheadedness. Okay for discharge from cardiology standpoint. Continue aspirin, Brilinta, statin, beta-blocker. May consider ACE/ARB at later date if BP tolerates. Nitro as needed at discharge. Cardiac rehab consulted. Follow-up with cardiology in 2 to 3 weeks.       Discharge Assessment and Plan:    Unstable angina/NSTEMI : [facility] 11/9 -severe mid LAD disease, s/p successful PCI/DONOVAN; Mass 2D 157.1 88 - 224 g    LV Mass 2D Index 74.1 49 - 115 g/m2    MV E/A 2.26     E/E' Lateral 13.57     LA Volume Index BP 37 (A) 16 - 34 ml/m2    LVOT Stroke Volume Index 37.0 mL/m2    LA Volume Index MOD A2C 38 (A) 16 - 34 ml/m2    LA Volume Index MOD A4C 35 (A) 16 - 34 ml/m2    LA Size Index 2.03 cm/m2    Ascending Aorta Index 1.65 cm/m2    AV Velocity Ratio 0.75     LVOT:AV VTI Index 0.70     FARIHA/BSA VTI 0.9 cm2/m2    FARIHA/BSA Peak Velocity 1.0 cm2/m2   POCT activated clotting time    Collection Time: 11/09/23 12:13 PM   Result Value Ref Range    Activated Clotting Time 287 (H) 1 - 150 seconds   POCT activated clotting time    Collection Time: 11/09/23 12:30 PM   Result Value Ref Range    Activated Clotting Time 293 (H) 1 - 150 seconds   EKG 12 Lead    Collection Time: 11/09/23  1:22 PM   Result Value Ref Range    Ventricular Rate 58 BPM    Atrial Rate 58 BPM    P-R Interval 166 ms    QRS Duration 94 ms    Q-T Interval 448 ms    QTc Calculation (Bazett) 439 ms    P Axis 39 degrees    R Axis 42 degrees    T Axis -69 degrees   Cardiac procedure    Collection Time: 11/09/23  1:30 PM   Result Value Ref Range    Body Surface Area 2.19 m2   Basic Metabolic Panel    Collection Time: 11/10/23  6:02 AM   Result Value Ref Range    Sodium 138 135 - 145 meq/L    Potassium 4.3 3.5 - 5.2 meq/L    Chloride 104 98 - 111 meq/L    CO2 22 (L) 23 - 33 meq/L    Glucose 119 (H) 70 - 108 mg/dL    BUN 19 7 - 22 mg/dL    Creatinine 1.1 0.4 - 1.2 mg/dL    Calcium 8.8 8.5 - 10.5 mg/dL   Anion Gap    Collection Time: 11/10/23  6:02 AM   Result Value Ref Range    Anion Gap 12.0 8.0 - 16.0 meq/L   Glomerular Filtration Rate, Estimated    Collection Time: 11/10/23  6:02 AM   Result Value Ref Range    Est, Glom Filt Rate >60 >60 ml/min/1.73m2        Microbiology:    Blood culture #1: No results found for: \"BC\"  Blood culture #2:No results found for: \"BLOODCULT2\"  Organism:  No results found for: \"LABGRAM\"  MRSA culture only:No results found

## 2023-11-14 ENCOUNTER — TELEPHONE (OUTPATIENT)
Dept: CARDIAC REHAB | Age: 88
End: 2023-11-14

## 2023-11-15 NOTE — PROGRESS NOTES
Brea Cabello ([phone]) on 11/9/2023 8:55:44 PM  11/9-1325 PM-EKG findings -Sinus bradycardia  Possible Inferior infarct , age undetermined ,Abnormal ECG  When compared with ECG of 08-NOV-2023 01:28,  Borderline criteria for Inferior infarct are now Present  ST no longer elevated in Anterior leads  T wave inversion now evident in Inferior leads  T wave inversion now evident in Lateral leads    11/10 Card. PN Chest pain/USA-  NSTEMI/elevated trop    Treatment: Heparin gtt , nitro gtt , ,Cardiology consult ,s/p [facility] 11/9 -severe   mid LAD disease, s/p successful PCI/DONOVAN;   of RCA ,NST,EKG,ECHO    Thank you  Breanne Presley RN CRCR PASCUAL  , [facility]  [email or web address]  Options provided:  -- NSTEMI poa  -- Type 2 MI poa  -- Demand Ischemia with MI poa  -- Demand Ischemia only, no MI  -- Unstable Angina and  MI ruled out  -- Unstable Angina and    MI poa  -- Other - I will add my own diagnosis  -- Disagree - Not applicable / Not valid  -- Disagree - Clinically unable to determine / Unknown  -- Refer to Clinical Documentation Reviewer    PROVIDER RESPONSE TEXT:    This patient has an NSTEMI poa. Query created by:  Irais Moe on 11/14/2023 10:43 AM      Electronically signed by:  Jolene Tsai PA-C 11/15/2023 7:40 AM

## 2023-11-19 ENCOUNTER — HOSPITAL ENCOUNTER (INPATIENT)
Age: 88
LOS: 4 days | Discharge: HOME | DRG: 309 | End: 2023-11-23
Attending: HOSPITALIST | Admitting: INTERNAL MEDICINE
Payer: MEDICARE

## 2023-11-19 DIAGNOSIS — Z51.81 MEDICATION MONITORING ENCOUNTER: ICD-10-CM

## 2023-11-19 DIAGNOSIS — I47.10 SVT (SUPRAVENTRICULAR TACHYCARDIA): Primary | ICD-10-CM

## 2023-11-19 LAB
ANION GAP SERPL CALC-SCNC: 10 MEQ/L (ref 8–16)
BASOPHILS ABSOLUTE: 0 THOU/MM3 (ref 0–0.1)
BASOPHILS NFR BLD AUTO: 0.7 %
BUN SERPL-MCNC: 20 MG/DL (ref 7–22)
CALCIUM SERPL-MCNC: 9 MG/DL (ref 8.5–10.5)
CHLORIDE SERPL-SCNC: 107 MEQ/L (ref 98–111)
CO2 SERPL-SCNC: 22 MEQ/L (ref 23–33)
CREAT SERPL-MCNC: 1.1 MG/DL (ref 0.4–1.2)
DEPRECATED RDW RBC AUTO: 47.8 FL (ref 35–45)
EKG ATRIAL RATE: 51 BPM
EKG ATRIAL RATE: 58 BPM
EKG ATRIAL RATE: 58 BPM
EKG P AXIS: 39 DEGREES
EKG P AXIS: 47 DEGREES
EKG P AXIS: 67 DEGREES
EKG P-R INTERVAL: 166 MS
EKG P-R INTERVAL: 166 MS
EKG P-R INTERVAL: 174 MS
EKG Q-T INTERVAL: 436 MS
EKG Q-T INTERVAL: 448 MS
EKG Q-T INTERVAL: 452 MS
EKG QRS DURATION: 92 MS
EKG QRS DURATION: 92 MS
EKG QRS DURATION: 94 MS
EKG QTC CALCULATION (BAZETT): 416 MS
EKG QTC CALCULATION (BAZETT): 428 MS
EKG QTC CALCULATION (BAZETT): 439 MS
EKG R AXIS: 42 DEGREES
EKG R AXIS: 57 DEGREES
EKG R AXIS: 79 DEGREES
EKG T AXIS: -69 DEGREES
EKG T AXIS: 73 DEGREES
EKG T AXIS: 79 DEGREES
EKG VENTRICULAR RATE: 51 BPM
EKG VENTRICULAR RATE: 58 BPM
EKG VENTRICULAR RATE: 58 BPM
EOSINOPHIL NFR BLD AUTO: 2.8 %
EOSINOPHILS ABSOLUTE: 0.2 THOU/MM3 (ref 0–0.4)
ERYTHROCYTE [DISTWIDTH] IN BLOOD BY AUTOMATED COUNT: 13.9 % (ref 11.5–14.5)
GFR SERPL CREATININE-BSD FRML MDRD: > 60 ML/MIN/1.73M2
GLUCOSE SERPL-MCNC: 107 MG/DL (ref 70–108)
HCT VFR BLD AUTO: 51.4 % (ref 42–52)
HGB BLD-MCNC: 16.5 GM/DL (ref 14–18)
IMM GRANULOCYTES # BLD AUTO: 0.02 THOU/MM3 (ref 0–0.07)
IMM GRANULOCYTES NFR BLD AUTO: 0.3 %
LYMPHOCYTES ABSOLUTE: 2 THOU/MM3 (ref 1–4.8)
LYMPHOCYTES NFR BLD AUTO: 28 %
MAGNESIUM SERPL-MCNC: 2 MG/DL (ref 1.6–2.4)
MCH RBC QN AUTO: 30.3 PG (ref 26–33)
MCHC RBC AUTO-ENTMCNC: 32.1 GM/DL (ref 32.2–35.5)
MCV RBC AUTO: 94.3 FL (ref 80–94)
MONOCYTES ABSOLUTE: 0.7 THOU/MM3 (ref 0.4–1.3)
MONOCYTES NFR BLD AUTO: 10.4 %
NEUTROPHILS NFR BLD AUTO: 57.8 %
NRBC BLD AUTO-RTO: 0 /100 WBC
PLATELET # BLD AUTO: 176 THOU/MM3 (ref 130–400)
PMV BLD AUTO: 10.9 FL (ref 9.4–12.4)
POTASSIUM SERPL-SCNC: 4.4 MEQ/L (ref 3.5–5.2)
RBC # BLD AUTO: 5.45 MILL/MM3 (ref 4.7–6.1)
SEGMENTED NEUTROPHILS ABSOLUTE COUNT: 4 THOU/MM3 (ref 1.8–7.7)
SODIUM SERPL-SCNC: 139 MEQ/L (ref 135–145)
WBC # BLD AUTO: 7 THOU/MM3 (ref 4.8–10.8)

## 2023-11-19 PROCEDURE — 36415 COLL VENOUS BLD VENIPUNCTURE: CPT

## 2023-11-19 PROCEDURE — 6360000002 HC RX W HCPCS

## 2023-11-19 PROCEDURE — 2580000003 HC RX 258

## 2023-11-19 PROCEDURE — 6370000000 HC RX 637 (ALT 250 FOR IP)

## 2023-11-19 PROCEDURE — 80048 BASIC METABOLIC PNL TOTAL CA: CPT

## 2023-11-19 PROCEDURE — 85025 COMPLETE CBC W/AUTO DIFF WBC: CPT

## 2023-11-19 PROCEDURE — 2140000000 HC CCU INTERMEDIATE R&B

## 2023-11-19 PROCEDURE — 99223 1ST HOSP IP/OBS HIGH 75: CPT | Performed by: HOSPITALIST

## 2023-11-19 PROCEDURE — 83735 ASSAY OF MAGNESIUM: CPT

## 2023-11-19 PROCEDURE — 93010 ELECTROCARDIOGRAM REPORT: CPT | Performed by: INTERNAL MEDICINE

## 2023-11-19 PROCEDURE — 93005 ELECTROCARDIOGRAM TRACING: CPT

## 2023-11-19 RX ORDER — VERAPAMIL HYDROCHLORIDE 240 MG/1
120 TABLET, FILM COATED, EXTENDED RELEASE ORAL DAILY
Status: DISCONTINUED | OUTPATIENT
Start: 2023-11-19 | End: 2023-11-23 | Stop reason: HOSPADM

## 2023-11-19 RX ORDER — NITROGLYCERIN 0.4 MG/1
0.4 TABLET SUBLINGUAL EVERY 5 MIN PRN
Status: DISCONTINUED | OUTPATIENT
Start: 2023-11-19 | End: 2023-11-23 | Stop reason: HOSPADM

## 2023-11-19 RX ORDER — SODIUM CHLORIDE 9 MG/ML
INJECTION, SOLUTION INTRAVENOUS PRN
Status: DISCONTINUED | OUTPATIENT
Start: 2023-11-19 | End: 2023-11-23 | Stop reason: HOSPADM

## 2023-11-19 RX ORDER — MAGNESIUM SULFATE IN WATER 40 MG/ML
2000 INJECTION, SOLUTION INTRAVENOUS PRN
Status: DISCONTINUED | OUTPATIENT
Start: 2023-11-19 | End: 2023-11-23 | Stop reason: HOSPADM

## 2023-11-19 RX ORDER — ENOXAPARIN SODIUM 100 MG/ML
40 INJECTION SUBCUTANEOUS NIGHTLY
Status: DISCONTINUED | OUTPATIENT
Start: 2023-11-19 | End: 2023-11-23 | Stop reason: HOSPADM

## 2023-11-19 RX ORDER — POLYETHYLENE GLYCOL 3350 17 G/17G
17 POWDER, FOR SOLUTION ORAL DAILY PRN
Status: DISCONTINUED | OUTPATIENT
Start: 2023-11-19 | End: 2023-11-23 | Stop reason: HOSPADM

## 2023-11-19 RX ORDER — POTASSIUM CHLORIDE 20 MEQ/1
40 TABLET, EXTENDED RELEASE ORAL PRN
Status: DISCONTINUED | OUTPATIENT
Start: 2023-11-19 | End: 2023-11-23 | Stop reason: HOSPADM

## 2023-11-19 RX ORDER — SODIUM CHLORIDE 0.9 % (FLUSH) 0.9 %
5-40 SYRINGE (ML) INJECTION EVERY 12 HOURS SCHEDULED
Status: DISCONTINUED | OUTPATIENT
Start: 2023-11-19 | End: 2023-11-23 | Stop reason: HOSPADM

## 2023-11-19 RX ORDER — ONDANSETRON 2 MG/ML
4 INJECTION INTRAMUSCULAR; INTRAVENOUS EVERY 6 HOURS PRN
Status: DISCONTINUED | OUTPATIENT
Start: 2023-11-19 | End: 2023-11-21

## 2023-11-19 RX ORDER — SODIUM CHLORIDE 0.9 % (FLUSH) 0.9 %
10 SYRINGE (ML) INJECTION PRN
Status: DISCONTINUED | OUTPATIENT
Start: 2023-11-19 | End: 2023-11-23 | Stop reason: HOSPADM

## 2023-11-19 RX ORDER — METOPROLOL TARTRATE 1 MG/ML
5 INJECTION, SOLUTION INTRAVENOUS
Status: COMPLETED | OUTPATIENT
Start: 2023-11-19 | End: 2023-11-20

## 2023-11-19 RX ORDER — ASPIRIN 81 MG/1
81 TABLET ORAL NIGHTLY
Status: DISCONTINUED | OUTPATIENT
Start: 2023-11-19 | End: 2023-11-23 | Stop reason: HOSPADM

## 2023-11-19 RX ORDER — POTASSIUM CHLORIDE 7.45 MG/ML
10 INJECTION INTRAVENOUS PRN
Status: DISCONTINUED | OUTPATIENT
Start: 2023-11-19 | End: 2023-11-23 | Stop reason: HOSPADM

## 2023-11-19 RX ORDER — ACETAMINOPHEN 325 MG/1
650 TABLET ORAL EVERY 6 HOURS PRN
Status: DISCONTINUED | OUTPATIENT
Start: 2023-11-19 | End: 2023-11-23 | Stop reason: HOSPADM

## 2023-11-19 RX ORDER — METOPROLOL TARTRATE 1 MG/ML
5 INJECTION, SOLUTION INTRAVENOUS ONCE
Status: DISCONTINUED | OUTPATIENT
Start: 2023-11-19 | End: 2023-11-19

## 2023-11-19 RX ORDER — ACETAMINOPHEN 650 MG/1
650 SUPPOSITORY RECTAL EVERY 6 HOURS PRN
Status: DISCONTINUED | OUTPATIENT
Start: 2023-11-19 | End: 2023-11-23 | Stop reason: HOSPADM

## 2023-11-19 RX ORDER — ONDANSETRON 4 MG/1
4 TABLET, ORALLY DISINTEGRATING ORAL EVERY 8 HOURS PRN
Status: DISCONTINUED | OUTPATIENT
Start: 2023-11-19 | End: 2023-11-23 | Stop reason: HOSPADM

## 2023-11-19 RX ORDER — HYDRALAZINE HYDROCHLORIDE 25 MG/1
25 TABLET, FILM COATED ORAL 3 TIMES DAILY
Status: DISCONTINUED | OUTPATIENT
Start: 2023-11-19 | End: 2023-11-23 | Stop reason: HOSPADM

## 2023-11-19 RX ORDER — DOCUSATE SODIUM 100 MG/1
100 CAPSULE, LIQUID FILLED ORAL
Status: DISCONTINUED | OUTPATIENT
Start: 2023-11-19 | End: 2023-11-23 | Stop reason: HOSPADM

## 2023-11-19 RX ORDER — ATORVASTATIN CALCIUM 40 MG/1
40 TABLET, FILM COATED ORAL NIGHTLY
Status: DISCONTINUED | OUTPATIENT
Start: 2023-11-19 | End: 2023-11-23 | Stop reason: HOSPADM

## 2023-11-19 RX ORDER — ATENOLOL 50 MG/1
50 TABLET ORAL NIGHTLY
Status: DISCONTINUED | OUTPATIENT
Start: 2023-11-19 | End: 2023-11-21

## 2023-11-19 RX ADMIN — DOCUSATE SODIUM 100 MG: 100 CAPSULE, LIQUID FILLED ORAL at 17:03

## 2023-11-19 RX ADMIN — HYDRALAZINE HYDROCHLORIDE 25 MG: 25 TABLET, FILM COATED ORAL at 17:01

## 2023-11-19 RX ADMIN — TICAGRELOR 90 MG: 90 TABLET ORAL at 19:57

## 2023-11-19 RX ADMIN — ASPIRIN 81 MG: 81 TABLET, COATED ORAL at 19:57

## 2023-11-19 RX ADMIN — HYDRALAZINE HYDROCHLORIDE 25 MG: 25 TABLET, FILM COATED ORAL at 19:57

## 2023-11-19 RX ADMIN — ENOXAPARIN SODIUM 40 MG: 100 INJECTION SUBCUTANEOUS at 20:06

## 2023-11-19 RX ADMIN — SODIUM CHLORIDE, PRESERVATIVE FREE 10 ML: 5 INJECTION INTRAVENOUS at 19:57

## 2023-11-19 ASSESSMENT — PAIN SCALES - GENERAL
PAINLEVEL_OUTOF10: 0
PAINLEVEL_OUTOF10: 0

## 2023-11-19 NOTE — FLOWSHEET NOTE
11/19/23 1318   Treatment Team Notification   Reason for Communication Review case   Name of Team Member Notified Dr. Stone Sepulveda Team Role Attending Provider   Method of Communication Secure Message   Response Waiting for response;See orders   Notification Time [id number]     Just arrived to 3[facility] from [facility]. Need admission orders.

## 2023-11-19 NOTE — H&P
Hospitalist History and Physical          Patient:  Imani Kimble  YOB: 1935    MRN: [id number]     Acct: [de-identified]    PCP: Raina Berger    Date of Admission: 11/19/2023    Date of Service: Pt seen/examined on 11/19/23  and Admitted to Inpatient with expected LOS greater than two midnights due to medical therapy. Chief Complaint:  Palpitations    ASSESSMENT/PLAN:    Palpitations: Patient reports new-onset palpitations concurrent with transitory tachycardia at [facility] that improved with IV lopressor. EKG from [facility] reported supraventricular tachycardia, but cannot be directly visualized. Suspect that this may be also due to A. Fib with RVR episode as SVT does not typically improve with IV Lopressor. Of note, the patient has sinus bradycardia with HR in the 50s when not in tachycardia, raising concern for sick sinus/tachy-madai syndrome. Ischemic heart disease does raise risk for palpitations; however, patient recently had 2 PCI/DONOVAN done on 11/9 with no other cardiac symptoms making this less likely. No evidence to suggest active infection or significant electrolyte abnormalities at this time. No obvious triggers such as caffeine, smoking, alcohol, stress, or illicit drug use. Recent TSH on 11/8/23 WNL. - Pacemaker to bedside.  - Mg ordered  - Hold verapamil due to bradycardia. - Continue to monitor on telemetry  - Consult Cardiology for potential pacemaker placement. Paroxysmal Atrial Fibrillation: Noted in history. Patient's palpitations may potentially be due to A. Fib with RVR episodes. CHADS2-VASc score = 4. Not currently on anticoagulation due to being on aspirin and brilinta for recent stent. On verapamil and atenolol for rate control.  - Hold verapamil held due to bradycardia. - Continue atenolol for hold parameters  - Continue aspirin and brilinta. - Continue to monitor on telemetry. Obstructive CAD: S/P 2 stents placed during cath procedure on 11/9/23.  Patient

## 2023-11-20 LAB
ANION GAP SERPL CALC-SCNC: 12 MEQ/L (ref 8–16)
BASOPHILS ABSOLUTE: 0.1 THOU/MM3 (ref 0–0.1)
BASOPHILS NFR BLD AUTO: 0.9 %
BUN SERPL-MCNC: 18 MG/DL (ref 7–22)
CA-I BLD ISE-SCNC: 1.17 MMOL/L (ref 1.12–1.32)
CALCIUM SERPL-MCNC: 9.2 MG/DL (ref 8.5–10.5)
CHLORIDE SERPL-SCNC: 105 MEQ/L (ref 98–111)
CO2 SERPL-SCNC: 21 MEQ/L (ref 23–33)
CREAT SERPL-MCNC: 1.1 MG/DL (ref 0.4–1.2)
DEPRECATED RDW RBC AUTO: 45.8 FL (ref 35–45)
EOSINOPHIL NFR BLD AUTO: 2.7 %
EOSINOPHILS ABSOLUTE: 0.2 THOU/MM3 (ref 0–0.4)
ERYTHROCYTE [DISTWIDTH] IN BLOOD BY AUTOMATED COUNT: 13.8 % (ref 11.5–14.5)
GFR SERPL CREATININE-BSD FRML MDRD: > 60 ML/MIN/1.73M2
GLUCOSE SERPL-MCNC: 124 MG/DL (ref 70–108)
HCT VFR BLD AUTO: 47.7 % (ref 42–52)
HGB BLD-MCNC: 15.9 GM/DL (ref 14–18)
IMM GRANULOCYTES # BLD AUTO: 0.04 THOU/MM3 (ref 0–0.07)
IMM GRANULOCYTES NFR BLD AUTO: 0.4 %
LYMPHOCYTES ABSOLUTE: 2.5 THOU/MM3 (ref 1–4.8)
LYMPHOCYTES NFR BLD AUTO: 27.3 %
MAGNESIUM SERPL-MCNC: 1.9 MG/DL (ref 1.6–2.4)
MCH RBC QN AUTO: 30.2 PG (ref 26–33)
MCHC RBC AUTO-ENTMCNC: 33.3 GM/DL (ref 32.2–35.5)
MCV RBC AUTO: 90.7 FL (ref 80–94)
MONOCYTES ABSOLUTE: 0.9 THOU/MM3 (ref 0.4–1.3)
MONOCYTES NFR BLD AUTO: 9.9 %
NEUTROPHILS NFR BLD AUTO: 58.8 %
NRBC BLD AUTO-RTO: 0 /100 WBC
PHOSPHATE SERPL-MCNC: 3.6 MG/DL (ref 2.4–4.7)
PLATELET # BLD AUTO: 186 THOU/MM3 (ref 130–400)
PMV BLD AUTO: 11 FL (ref 9.4–12.4)
POTASSIUM SERPL-SCNC: 4.2 MEQ/L (ref 3.5–5.2)
RBC # BLD AUTO: 5.26 MILL/MM3 (ref 4.7–6.1)
SEGMENTED NEUTROPHILS ABSOLUTE COUNT: 5.4 THOU/MM3 (ref 1.8–7.7)
SODIUM SERPL-SCNC: 138 MEQ/L (ref 135–145)
TROPONIN, HIGH SENSITIVITY: 39 NG/L (ref 0–12)
WBC # BLD AUTO: 9.2 THOU/MM3 (ref 4.8–10.8)

## 2023-11-20 PROCEDURE — 99223 1ST HOSP IP/OBS HIGH 75: CPT | Performed by: NUCLEAR MEDICINE

## 2023-11-20 PROCEDURE — 2140000000 HC CCU INTERMEDIATE R&B

## 2023-11-20 PROCEDURE — 36415 COLL VENOUS BLD VENIPUNCTURE: CPT

## 2023-11-20 PROCEDURE — 2580000003 HC RX 258

## 2023-11-20 PROCEDURE — 6370000000 HC RX 637 (ALT 250 FOR IP)

## 2023-11-20 PROCEDURE — 99222 1ST HOSP IP/OBS MODERATE 55: CPT | Performed by: INTERNAL MEDICINE

## 2023-11-20 PROCEDURE — 85025 COMPLETE CBC W/AUTO DIFF WBC: CPT

## 2023-11-20 PROCEDURE — 99232 SBSQ HOSP IP/OBS MODERATE 35: CPT | Performed by: INTERNAL MEDICINE

## 2023-11-20 PROCEDURE — 80048 BASIC METABOLIC PNL TOTAL CA: CPT

## 2023-11-20 PROCEDURE — 84484 ASSAY OF TROPONIN QUANT: CPT

## 2023-11-20 PROCEDURE — 84100 ASSAY OF PHOSPHORUS: CPT

## 2023-11-20 PROCEDURE — 2500000003 HC RX 250 WO HCPCS

## 2023-11-20 PROCEDURE — 82330 ASSAY OF CALCIUM: CPT

## 2023-11-20 PROCEDURE — 6360000002 HC RX W HCPCS

## 2023-11-20 PROCEDURE — 83735 ASSAY OF MAGNESIUM: CPT

## 2023-11-20 RX ORDER — ATORVASTATIN CALCIUM 40 MG/1
40 TABLET, FILM COATED ORAL NIGHTLY
COMMUNITY

## 2023-11-20 RX ORDER — POLYETHYLENE GLYCOL 3350 17 G/17G
17 POWDER, FOR SOLUTION ORAL DAILY PRN
COMMUNITY

## 2023-11-20 RX ADMIN — ENOXAPARIN SODIUM 40 MG: 100 INJECTION SUBCUTANEOUS at 21:18

## 2023-11-20 RX ADMIN — SODIUM CHLORIDE, PRESERVATIVE FREE 10 ML: 5 INJECTION INTRAVENOUS at 10:06

## 2023-11-20 RX ADMIN — HYDRALAZINE HYDROCHLORIDE 25 MG: 25 TABLET, FILM COATED ORAL at 13:52

## 2023-11-20 RX ADMIN — TICAGRELOR 90 MG: 90 TABLET ORAL at 10:06

## 2023-11-20 RX ADMIN — HYDRALAZINE HYDROCHLORIDE 25 MG: 25 TABLET, FILM COATED ORAL at 21:18

## 2023-11-20 RX ADMIN — ASPIRIN 81 MG: 81 TABLET, COATED ORAL at 21:18

## 2023-11-20 RX ADMIN — TICAGRELOR 90 MG: 90 TABLET ORAL at 21:18

## 2023-11-20 RX ADMIN — SODIUM CHLORIDE, PRESERVATIVE FREE 10 ML: 5 INJECTION INTRAVENOUS at 21:19

## 2023-11-20 RX ADMIN — HYDRALAZINE HYDROCHLORIDE 25 MG: 25 TABLET, FILM COATED ORAL at 10:06

## 2023-11-20 RX ADMIN — METOPROLOL TARTRATE 5 MG: 5 INJECTION INTRAVENOUS at 03:31

## 2023-11-20 RX ADMIN — DOCUSATE SODIUM 100 MG: 100 CAPSULE, LIQUID FILLED ORAL at 11:52

## 2023-11-20 RX ADMIN — ATORVASTATIN CALCIUM 40 MG: 40 TABLET, FILM COATED ORAL at 21:18

## 2023-11-20 NOTE — PROGRESS NOTES
Hospitalist Progress Note          Patient:  Lexx Fleming  YOB: 1935    MRN: [id number]     Acct: [de-identified]    PCP: Ankush Brar    Date of Admission: 11/19/2023    Date of Service: Pt seen/examined on 11/20/23  and Admitted to Inpatient with expected LOS greater than two midnights due to medical therapy. Chief Complaint:  Palpitations    ASSESSMENT/PLAN:    Palpitations: Patient reports new-onset palpitations concurrent with transitory tachycardia at [facility] that improved with IV lopressor. EKG from [facility] reported supraventricular tachycardia, but cannot be directly visualized. Suspect that this may be also due to A. Fib with RVR episode as SVT does not typically improve with IV Lopressor. Of note, the patient has sinus bradycardia with HR in the 50s when not in tachycardia, raising concern for sick sinus/tachy-madai syndrome. Ischemic heart disease does raise risk for palpitations; however, patient recently had 2 PCI/DONOVAN done on 11/9 with no other cardiac symptoms making this less likely. No evidence to suggest active infection or significant electrolyte abnormalities at this time. No obvious triggers such as caffeine, smoking, alcohol, stress, or illicit drug use. Recent TSH on 11/8/23 WNL. - Pacemaker to bedside.  - Mg ordered  - Hold verapamil due to bradycardia. - Continue to monitor on telemetry  - Consult Cardiology for potential pacemaker placement. Paroxysmal Atrial Fibrillation: Noted in history. Patient's palpitations may potentially be due to A. Fib with RVR episodes. CHADS2-VASc score = 4. Not currently on anticoagulation due to being on aspirin and brilinta for recent stent. On verapamil and atenolol for rate control.  - Hold verapamil held due to bradycardia. - Continue atenolol for hold parameters  - Continue aspirin and brilinta. - Continue to monitor on telemetry. Obstructive CAD: S/P 2 stents placed during cath procedure on 11/9/23.  Patient currently

## 2023-11-20 NOTE — CARE COORDINATION
Case Management Assessment  Initial Evaluation    Date/Time of Evaluation: 11/20/2023 2:28 PM  Assessment Completed by: Erika Smith RN    If patient is discharged prior to next notation, then this note serves as note for discharge by case management. Patient Name: Bia Sheppard                   YOB: 1935  Diagnosis: SVT (supraventricular tachycardia) [I47.10]                   Date / Time: 11/19/2023 12:32 PM  Location: [address] [place] [address]     Patient Admission Status: Inpatient   Readmission Risk Low 0-14, Mod 15-19), High > 20: Readmission Risk Score: 11.7    Current PCP: David French  PCP verified by CM? Yes    Chart Reviewed: Yes      History Provided by: Patient  Patient Orientation: Alert and Oriented    Patient Cognition: Alert    Hospitalization in the last 30 days (Readmission):  Yes    If yes, Readmission Assessment in CM Navigator will be completed. Advance Directives:      Code Status: Full Code   Patient's Primary Decision Maker is: Legal Next of Kin (Has at home)      Discharge Planning:    Patient lives with: Spouse/Significant Other Type of Home: House  Primary Care Giver: Self  Patient Support Systems include: Spouse/Significant Other, Children, Family Members   Current Financial resources: Medicare, Other (Comment) ([facility])  Current community resources: None  Current services prior to admission: Durable Medical Equipment            Current DME: Other (Comment) (Urostomy; changes pouch q4 days)            Type of Home Care services:  None    ADLS  Prior functional level: Independent in ADLs/IADLs  Current functional level: Independent in ADLs/IADLs    Family can provide assistance at DC: Yes  Would you like Case Management to discuss the discharge plan with any other family members/significant others, and if so, who?  No  Plans to Return to Present Housing: Yes  Other Identified Issues/Barriers to RETURNING to current housing: none  Potential Assistance

## 2023-11-20 NOTE — CARE COORDINATION
11/20/23 1119   Readmission Assessment   Number of Days since last admission? 8-30 days   Previous Disposition Home with Family   Who is being Interviewed Patient   What was the patient's/caregiver's perception as to why they think they needed to return back to the hospital? Other (Comment)  (rapid HR)   Did you visit your Primary Care Physician after you left the hospital, before you returned this time? Yes   Did you see a specialist, such as Cardiac, Pulmonary, Orthopedic Physician, etc. after you left the hospital? No   Who advised the patient to return to the hospital? Self-referral   In our efforts to provide the best possible care to you and others like you, can you think of anything that we could have done to help you after you left the hospital the first time, so that you might not have needed to return so soon?  Other (Comment)  (didn't get to see physician prior to discharge)

## 2023-11-20 NOTE — PROGRESS NOTES
Pharmacy Medication History Note      List of current medications patient is taking is complete. Source of information: patient and outside fill history     Changes made to medication list:  Medications removed (include reason, ex. therapy complete or physician discontinued): Atorvastatin - replaced with correct strength     Medications added/doses adjusted:  Adjusted atenolol to 0.5 tab (25 mg) daily   Adjusted docusate to 2 tab (200 mg) daily at lunch   Added polyethylene glycol daily as needed for constipation   Added atorvastatin 40 mg daily     Other notes (ex. Recent course of antibiotics, Coumadin dosing):  Denies use of other OTC or herbal medications.       Allergies reviewed      Electronically signed by Yoly Moraes on 11/20/2023 at 10:32 AM

## 2023-11-21 LAB
BASOPHILS ABSOLUTE: 0.1 THOU/MM3 (ref 0–0.1)
BASOPHILS NFR BLD AUTO: 0.8 %
DEPRECATED RDW RBC AUTO: 46.1 FL (ref 35–45)
EOSINOPHIL NFR BLD AUTO: 3.4 %
EOSINOPHILS ABSOLUTE: 0.3 THOU/MM3 (ref 0–0.4)
ERYTHROCYTE [DISTWIDTH] IN BLOOD BY AUTOMATED COUNT: 13.7 % (ref 11.5–14.5)
HCT VFR BLD AUTO: 47.2 % (ref 42–52)
HGB BLD-MCNC: 15.7 GM/DL (ref 14–18)
IMM GRANULOCYTES # BLD AUTO: 0.02 THOU/MM3 (ref 0–0.07)
IMM GRANULOCYTES NFR BLD AUTO: 0.3 %
LYMPHOCYTES ABSOLUTE: 1.9 THOU/MM3 (ref 1–4.8)
LYMPHOCYTES NFR BLD AUTO: 25.2 %
MCH RBC QN AUTO: 30.4 PG (ref 26–33)
MCHC RBC AUTO-ENTMCNC: 33.3 GM/DL (ref 32.2–35.5)
MCV RBC AUTO: 91.3 FL (ref 80–94)
MONOCYTES ABSOLUTE: 0.8 THOU/MM3 (ref 0.4–1.3)
MONOCYTES NFR BLD AUTO: 10.6 %
NEUTROPHILS NFR BLD AUTO: 59.7 %
NRBC BLD AUTO-RTO: 0 /100 WBC
PLATELET # BLD AUTO: 180 THOU/MM3 (ref 130–400)
PMV BLD AUTO: 11.1 FL (ref 9.4–12.4)
RBC # BLD AUTO: 5.17 MILL/MM3 (ref 4.7–6.1)
SEGMENTED NEUTROPHILS ABSOLUTE COUNT: 4.6 THOU/MM3 (ref 1.8–7.7)
WBC # BLD AUTO: 7.7 THOU/MM3 (ref 4.8–10.8)

## 2023-11-21 PROCEDURE — 6370000000 HC RX 637 (ALT 250 FOR IP): Performed by: STUDENT IN AN ORGANIZED HEALTH CARE EDUCATION/TRAINING PROGRAM

## 2023-11-21 PROCEDURE — 2140000000 HC CCU INTERMEDIATE R&B

## 2023-11-21 PROCEDURE — 99232 SBSQ HOSP IP/OBS MODERATE 35: CPT | Performed by: INTERNAL MEDICINE

## 2023-11-21 PROCEDURE — 36415 COLL VENOUS BLD VENIPUNCTURE: CPT

## 2023-11-21 PROCEDURE — 6370000000 HC RX 637 (ALT 250 FOR IP)

## 2023-11-21 PROCEDURE — 2580000003 HC RX 258

## 2023-11-21 PROCEDURE — 6360000002 HC RX W HCPCS

## 2023-11-21 PROCEDURE — 93005 ELECTROCARDIOGRAM TRACING: CPT

## 2023-11-21 PROCEDURE — 85025 COMPLETE CBC W/AUTO DIFF WBC: CPT

## 2023-11-21 PROCEDURE — 93005 ELECTROCARDIOGRAM TRACING: CPT | Performed by: INTERNAL MEDICINE

## 2023-11-21 RX ORDER — SOTALOL HYDROCHLORIDE 80 MG/1
80 TABLET ORAL 2 TIMES DAILY
Status: COMPLETED | OUTPATIENT
Start: 2023-11-21 | End: 2023-11-21

## 2023-11-21 RX ORDER — SOTALOL HYDROCHLORIDE 80 MG/1
80 TABLET ORAL ONCE
Status: DISCONTINUED | OUTPATIENT
Start: 2023-11-21 | End: 2023-11-21 | Stop reason: SDUPTHER

## 2023-11-21 RX ORDER — SOTALOL HYDROCHLORIDE 80 MG/1
80 TABLET ORAL 2 TIMES DAILY
Status: DISCONTINUED | OUTPATIENT
Start: 2023-11-22 | End: 2023-11-23 | Stop reason: HOSPADM

## 2023-11-21 RX ADMIN — SODIUM CHLORIDE, PRESERVATIVE FREE 10 ML: 5 INJECTION INTRAVENOUS at 20:02

## 2023-11-21 RX ADMIN — ASPIRIN 81 MG: 81 TABLET, COATED ORAL at 20:00

## 2023-11-21 RX ADMIN — SOTALOL HYDROCHLORIDE 80 MG: 80 TABLET ORAL at 16:31

## 2023-11-21 RX ADMIN — HYDRALAZINE HYDROCHLORIDE 25 MG: 25 TABLET, FILM COATED ORAL at 14:59

## 2023-11-21 RX ADMIN — HYDRALAZINE HYDROCHLORIDE 25 MG: 25 TABLET, FILM COATED ORAL at 08:07

## 2023-11-21 RX ADMIN — ENOXAPARIN SODIUM 40 MG: 100 INJECTION SUBCUTANEOUS at 20:00

## 2023-11-21 RX ADMIN — SOTALOL HYDROCHLORIDE 80 MG: 80 TABLET ORAL at 23:06

## 2023-11-21 RX ADMIN — TICAGRELOR 90 MG: 90 TABLET ORAL at 08:07

## 2023-11-21 RX ADMIN — SODIUM CHLORIDE, PRESERVATIVE FREE 10 ML: 5 INJECTION INTRAVENOUS at 08:07

## 2023-11-21 RX ADMIN — TICAGRELOR 90 MG: 90 TABLET ORAL at 20:00

## 2023-11-21 RX ADMIN — HYDRALAZINE HYDROCHLORIDE 25 MG: 25 TABLET, FILM COATED ORAL at 20:00

## 2023-11-21 RX ADMIN — DOCUSATE SODIUM 100 MG: 100 CAPSULE, LIQUID FILLED ORAL at 14:59

## 2023-11-21 NOTE — PROGRESS NOTES
Hospitalist Progress Note          Patient:  Cuate Clarke  YOB: 1935    MRN: [id number]     Acct: [de-identified]    PCP: Riccardo Brown    Date of Admission: 11/19/2023    Date of Service: Pt seen/examined on 11/21/23  and Admitted to Inpatient with expected LOS greater than two midnights due to medical therapy. Chief Complaint:  Palpitations    ASSESSMENT/PLAN:    Palpitations: Patient reports new-onset palpitations concurrent with transitory tachycardia at [facility] that improved with IV lopressor. EKG from [facility] reported supraventricular tachycardia, but cannot be directly visualized. Suspect that this may be also due to A. Fib with RVR episode as SVT does not typically improve with IV Lopressor. Of note, the patient has sinus bradycardia with HR in the 50s when not in tachycardia, raising concern for sick sinus/tachy-madai syndrome. Ischemic heart disease does raise risk for palpitations; however, patient recently had 2 PCI/DONOVAN done on 11/9 with no other cardiac symptoms making this less likely. No evidence to suggest active infection or significant electrolyte abnormalities at this time. No obvious triggers such as caffeine, smoking, alcohol, stress, or illicit drug use. Recent TSH on 11/8/23 WNL. - Pacemaker to bedside.  - Mg ordered  - Hold verapamil due to bradycardia. - Continue to monitor on telemetry  - Consult Cardiology for potential pacemaker placement. Paroxysmal Atrial Fibrillation: Noted in history. Patient's palpitations may potentially be due to A. Fib with RVR episodes. CHADS2-VASc score = 4. Not currently on anticoagulation due to being on aspirin and brilinta for recent stent. On verapamil and atenolol for rate control.  - Hold verapamil held due to bradycardia. - Continue atenolol for hold parameters  - Continue aspirin and brilinta. - Continue to monitor on telemetry. Obstructive CAD: S/P 2 stents placed during cath procedure on 11/9/23.  Patient currently

## 2023-11-21 NOTE — CARE COORDINATION
11/21/23, 2:42 PM EST    DISCHARGE ON GOING EVALUATION    [facility] day: 2  Location: [facility]/036-A Reason for admit: SVT (supraventricular tachycardia) [I47.10]     Barriers to Discharge: Hospitalist, Cardiology and EP following. Awaiting for EP to decide whether will have ablation today vs OP. If plan is for OP, pt to be discharged home today. PCP: Myriam Payne  Readmission Risk Score: 11.8%  Patient Goals/Plan/Treatment Preferences: Plans home with wife and family support. Denies needs. Possible discharge. 11/21/23, 2:47 PM EST    Patient goals/plan/ treatment preferences discussed by  and . Patient goals/plan/ treatment preferences reviewed with patient/ family. Patient/ family verbalize understanding of discharge plan and are in agreement with goal/plan/treatment preferences. Understanding was demonstrated using the teach back method. AVS provided by RN at time of discharge, which includes all necessary medical information pertaining to the patients current course of illness, treatment, post-discharge goals of care, and treatment preferences.      Services At/After Discharge: None       IMM Letter  IMM Letter given to Patient/Family/Significant other/Guardian/POA/by[de-identified] Smiley Dunlap RN CM  IMM Letter date given[de-identified] 11/21/23  IMM Letter time given[de-identified] [id number]

## 2023-11-21 NOTE — PROGRESS NOTES
Cardiology Progress Note      Patient:  Lesa Donnelly  YOB: 1935  MRN: [id number]   Acct: [de-identified]  Admit Date:  11/19/2023  Primary Cardiologist:  Dr Sabino Guaman  Note per DR Joan Soni:  \"CHIEF COMPLAINT: Palpitations. Cardiology consulted for concern for tachy-madai syndrome. HPI: This is a pleasant 82 y.o. male with PMH A-fib, CAD s/p balloon angioplasty in 1985 as well as multiple stents 11/8/2023, HTN, HLD presents with chief complaint of palpitations. Reportedly was awoken from sleep with palpitations and presented to [facility] where EKG showed SVT. He was given IV Lopressor which stabilized his heart rate. He has a longstanding history of palpitations dating back more than 20 years, but reports his palpitation episodes have been occurring more frequently, 4 times in the last month. He denies any chest pain, but does report abdominal pressure when he has palpitations. He has no current chest pain or shortness of breath. Telemetry review shows narrow complex SVT with rates in the 130s this morning around 3:45 AM.     EKG shows sinus bradycardia at a rate of 58 bpm.  No longer evidence of ischemia when compared to prior EKGs. Echo: 11/8/2023. Mildly reduced LV systolic function visually estimated EF 45 to 50%. Normal LV size and function. Moderately thickened and calcified aortic valve cusp. Mild mitral regurgitation. Clement Shira nuclear stress test: 11/8/2023 moderate severity left ventricular stress perfusion defect medium in size in the basal to mid inferolateral segments that are partially visible. Defect appears to be infarction and diomedes-infarct ischemia. Left heart cath: 11/8/2023.  of RCA with left to right collaterals. Severe mid LAD status post successful DONOVAN. DONOVAN to mid left circumflex. DONOVAN to first obtuse marginal branch.       All labs, EKG's, diagnostic testing and images as well as cardiac cath, stress testing were reviewed during this

## 2023-11-22 ENCOUNTER — TELEPHONE (OUTPATIENT)
Dept: CARDIOLOGY CLINIC | Age: 88
End: 2023-11-22

## 2023-11-22 DIAGNOSIS — I47.10 SVT (SUPRAVENTRICULAR TACHYCARDIA): Primary | ICD-10-CM

## 2023-11-22 LAB
ANION GAP SERPL CALC-SCNC: 11 MEQ/L (ref 8–16)
BASOPHILS ABSOLUTE: 0.1 THOU/MM3 (ref 0–0.1)
BASOPHILS NFR BLD AUTO: 0.6 %
BUN SERPL-MCNC: 24 MG/DL (ref 7–22)
CALCIUM SERPL-MCNC: 9.2 MG/DL (ref 8.5–10.5)
CHLORIDE SERPL-SCNC: 101 MEQ/L (ref 98–111)
CO2 SERPL-SCNC: 23 MEQ/L (ref 23–33)
CREAT SERPL-MCNC: 1.3 MG/DL (ref 0.4–1.2)
DEPRECATED RDW RBC AUTO: 44.6 FL (ref 35–45)
EKG ATRIAL RATE: 56 BPM
EKG ATRIAL RATE: 63 BPM
EKG P AXIS: 45 DEGREES
EKG P AXIS: 49 DEGREES
EKG P-R INTERVAL: 158 MS
EKG P-R INTERVAL: 162 MS
EKG Q-T INTERVAL: 422 MS
EKG Q-T INTERVAL: 472 MS
EKG QRS DURATION: 92 MS
EKG QRS DURATION: 94 MS
EKG QTC CALCULATION (BAZETT): 431 MS
EKG QTC CALCULATION (BAZETT): 455 MS
EKG R AXIS: 63 DEGREES
EKG R AXIS: 67 DEGREES
EKG T AXIS: 47 DEGREES
EKG T AXIS: 73 DEGREES
EKG VENTRICULAR RATE: 56 BPM
EKG VENTRICULAR RATE: 63 BPM
EOSINOPHIL NFR BLD AUTO: 3.1 %
EOSINOPHILS ABSOLUTE: 0.3 THOU/MM3 (ref 0–0.4)
ERYTHROCYTE [DISTWIDTH] IN BLOOD BY AUTOMATED COUNT: 13.7 % (ref 11.5–14.5)
GFR SERPL CREATININE-BSD FRML MDRD: 53 ML/MIN/1.73M2
GLUCOSE SERPL-MCNC: 166 MG/DL (ref 70–108)
HCT VFR BLD AUTO: 45.8 % (ref 42–52)
HGB BLD-MCNC: 15.4 GM/DL (ref 14–18)
IMM GRANULOCYTES # BLD AUTO: 0.04 THOU/MM3 (ref 0–0.07)
IMM GRANULOCYTES NFR BLD AUTO: 0.5 %
LYMPHOCYTES ABSOLUTE: 2.2 THOU/MM3 (ref 1–4.8)
LYMPHOCYTES NFR BLD AUTO: 25.3 %
MCH RBC QN AUTO: 30.2 PG (ref 26–33)
MCHC RBC AUTO-ENTMCNC: 33.6 GM/DL (ref 32.2–35.5)
MCV RBC AUTO: 89.8 FL (ref 80–94)
MONOCYTES ABSOLUTE: 0.9 THOU/MM3 (ref 0.4–1.3)
MONOCYTES NFR BLD AUTO: 10.7 %
NEUTROPHILS NFR BLD AUTO: 59.8 %
NRBC BLD AUTO-RTO: 0 /100 WBC
PLATELET # BLD AUTO: 188 THOU/MM3 (ref 130–400)
PMV BLD AUTO: 10.6 FL (ref 9.4–12.4)
POTASSIUM SERPL-SCNC: 4.4 MEQ/L (ref 3.5–5.2)
RBC # BLD AUTO: 5.1 MILL/MM3 (ref 4.7–6.1)
SEGMENTED NEUTROPHILS ABSOLUTE COUNT: 5.1 THOU/MM3 (ref 1.8–7.7)
SODIUM SERPL-SCNC: 135 MEQ/L (ref 135–145)
WBC # BLD AUTO: 8.6 THOU/MM3 (ref 4.8–10.8)

## 2023-11-22 PROCEDURE — 6370000000 HC RX 637 (ALT 250 FOR IP)

## 2023-11-22 PROCEDURE — 93005 ELECTROCARDIOGRAM TRACING: CPT | Performed by: INTERNAL MEDICINE

## 2023-11-22 PROCEDURE — 6360000002 HC RX W HCPCS

## 2023-11-22 PROCEDURE — 2580000003 HC RX 258

## 2023-11-22 PROCEDURE — 93010 ELECTROCARDIOGRAM REPORT: CPT | Performed by: INTERNAL MEDICINE

## 2023-11-22 PROCEDURE — 36415 COLL VENOUS BLD VENIPUNCTURE: CPT

## 2023-11-22 PROCEDURE — 85025 COMPLETE CBC W/AUTO DIFF WBC: CPT

## 2023-11-22 PROCEDURE — 2140000000 HC CCU INTERMEDIATE R&B

## 2023-11-22 PROCEDURE — 99232 SBSQ HOSP IP/OBS MODERATE 35: CPT | Performed by: INTERNAL MEDICINE

## 2023-11-22 PROCEDURE — 80048 BASIC METABOLIC PNL TOTAL CA: CPT

## 2023-11-22 PROCEDURE — 6370000000 HC RX 637 (ALT 250 FOR IP): Performed by: NURSE PRACTITIONER

## 2023-11-22 RX ORDER — SOTALOL HYDROCHLORIDE 80 MG/1
80 TABLET ORAL 2 TIMES DAILY
Qty: 60 TABLET | Refills: 3 | Status: SHIPPED | OUTPATIENT
Start: 2023-11-22

## 2023-11-22 RX ADMIN — TICAGRELOR 90 MG: 90 TABLET ORAL at 08:38

## 2023-11-22 RX ADMIN — SODIUM CHLORIDE, PRESERVATIVE FREE 10 ML: 5 INJECTION INTRAVENOUS at 20:37

## 2023-11-22 RX ADMIN — HYDRALAZINE HYDROCHLORIDE 25 MG: 25 TABLET, FILM COATED ORAL at 08:38

## 2023-11-22 RX ADMIN — ATORVASTATIN CALCIUM 40 MG: 40 TABLET, FILM COATED ORAL at 20:37

## 2023-11-22 RX ADMIN — HYDRALAZINE HYDROCHLORIDE 25 MG: 25 TABLET, FILM COATED ORAL at 20:37

## 2023-11-22 RX ADMIN — SOTALOL HYDROCHLORIDE 80 MG: 80 TABLET ORAL at 08:38

## 2023-11-22 RX ADMIN — SODIUM CHLORIDE, PRESERVATIVE FREE 10 ML: 5 INJECTION INTRAVENOUS at 08:36

## 2023-11-22 RX ADMIN — ASPIRIN 81 MG: 81 TABLET, COATED ORAL at 20:37

## 2023-11-22 RX ADMIN — HYDRALAZINE HYDROCHLORIDE 25 MG: 25 TABLET, FILM COATED ORAL at 13:37

## 2023-11-22 RX ADMIN — ENOXAPARIN SODIUM 40 MG: 100 INJECTION SUBCUTANEOUS at 20:37

## 2023-11-22 RX ADMIN — DOCUSATE SODIUM 100 MG: 100 CAPSULE, LIQUID FILLED ORAL at 11:10

## 2023-11-22 RX ADMIN — TICAGRELOR 90 MG: 90 TABLET ORAL at 20:37

## 2023-11-22 RX ADMIN — SOTALOL HYDROCHLORIDE 80 MG: 80 TABLET ORAL at 20:37

## 2023-11-22 NOTE — PROGRESS NOTES
Patient and family had many concerns and questions about plan for his care. This nurse sent message to on-call, Cynthia Montez CNP to address tomorrow when seen.

## 2023-11-22 NOTE — TELEPHONE ENCOUNTER
V/O PER DR KAISER   PT IS IN ROOM 3B 36 AND HE WILL NEED SCHEDULED FOR AN OUTPT EP STUDY WITH SVT ABLATION       SCHEDULING FORM FILLED OUT AND GIVEN TO ALFONSO     PT IS AWARE HE WILL BE HAVING THIS TEST DONE

## 2023-11-22 NOTE — PROGRESS NOTES
Hospitalist Progress Note          Patient:  Jay Soria  YOB: 1935    MRN: [id number]     Acct: [de-identified]    PCP: Tawanda Clemente    Date of Admission: 11/19/2023    Date of Service: Pt seen/examined on 11/22/23  and Admitted to Inpatient with expected LOS greater than two midnights due to medical therapy. Chief Complaint:  Palpitations    ASSESSMENT/PLAN:    Palpitations: Patient reports new-onset palpitations concurrent with transitory tachycardia at [facility] that improved with IV lopressor. EKG from [facility] reported supraventricular tachycardia, but cannot be directly visualized. Suspect that this may be also due to A. Fib with RVR episode as SVT does not typically improve with IV Lopressor. Of note, the patient has sinus bradycardia with HR in the 50s when not in tachycardia, raising concern for sick sinus/tachy-madai syndrome. Ischemic heart disease does raise risk for palpitations; however, patient recently had 2 PCI/DONOVAN done on 11/9 with no other cardiac symptoms making this less likely. No evidence to suggest active infection or significant electrolyte abnormalities at this time. No obvious triggers such as caffeine, smoking, alcohol, stress, or illicit drug use. Recent TSH on 11/8/23 WNL. - Pacemaker to bedside.  - Mg ordered  - Hold verapamil due to bradycardia. - Continue to monitor on telemetry  - Consult Cardiology for potential pacemaker placement. Paroxysmal Atrial Fibrillation: Noted in history. Patient's palpitations may potentially be due to A. Fib with RVR episodes. CHADS2-VASc score = 4. Not currently on anticoagulation due to being on aspirin and brilinta for recent stent. On verapamil and atenolol for rate control.  - Hold verapamil held due to bradycardia. - Continue atenolol for hold parameters  - Continue aspirin and brilinta. - Continue to monitor on telemetry. Obstructive CAD: S/P 2 stents placed during cath procedure on 11/9/23.  Patient currently

## 2023-11-22 NOTE — CARE COORDINATION
11/22/23, 2:46 PM EST    DISCHARGE ON GOING EVALUATION    [facility] day: 3  Location: [facility]/036-A Reason for admit: SVT (supraventricular tachycardia) [I47.10]     Barriers to Discharge: Hospitalist, Cardiology and EP following. EP planning OP ablation. Started pt on Sotalol yesterday. Planning discharge tomorrow. PCP: Melida Mendez  Readmission Risk Score: 11.3%  Patient Goals/Plan/Treatment Preferences: Plans home with wife and family support. Denies needs. Anticipate discharge home tomorrow. 11/22/23, 2:47 PM EST    Patient goals/plan/ treatment preferences discussed by  and . Patient goals/plan/ treatment preferences reviewed with patient/ family. Patient/ family verbalize understanding of discharge plan and are in agreement with goal/plan/treatment preferences. Understanding was demonstrated using the teach back method. AVS provided by RN at time of discharge, which includes all necessary medical information pertaining to the patients current course of illness, treatment, post-discharge goals of care, and treatment preferences.      Services At/After Discharge: None       IMM Letter  IMM Letter given to Patient/Family/Significant other/Guardian/POA/by[de-identified] Nova Calderon RN CM  IMM Letter date given[de-identified] 11/21/23  IMM Letter time given[de-identified] 2526

## 2023-11-22 NOTE — DISCHARGE INSTRUCTIONS
Event monitor for 2 weeks at discharge  Follow with Denisse 4 weeks - his office will call to set up the appointment  [phone] - use option #3  Cardiology office is located on 2K in the main hospital  [address] at [address] - enter front entrance of hospital and go to left at . Take K elevators to 2nd floor. Cardiology check-in desk will be to your right as you exit the elevator.

## 2023-11-23 ENCOUNTER — APPOINTMENT (OUTPATIENT)
Age: 88
DRG: 309 | End: 2023-11-23
Attending: NURSE PRACTITIONER
Payer: MEDICARE

## 2023-11-23 VITALS
HEIGHT: 69 IN | BODY MASS INDEX: 30.4 KG/M2 | WEIGHT: 205.25 LBS | RESPIRATION RATE: 18 BRPM | HEART RATE: 53 BPM | TEMPERATURE: 97.6 F | OXYGEN SATURATION: 97 % | DIASTOLIC BLOOD PRESSURE: 68 MMHG | SYSTOLIC BLOOD PRESSURE: 158 MMHG

## 2023-11-23 LAB
ECHO BSA: 2.16 M2
EKG ATRIAL RATE: 53 BPM
EKG ATRIAL RATE: 54 BPM
EKG P AXIS: -2 DEGREES
EKG P AXIS: 63 DEGREES
EKG P-R INTERVAL: 164 MS
EKG P-R INTERVAL: 166 MS
EKG Q-T INTERVAL: 480 MS
EKG Q-T INTERVAL: 482 MS
EKG QRS DURATION: 90 MS
EKG QRS DURATION: 98 MS
EKG QTC CALCULATION (BAZETT): 452 MS
EKG QTC CALCULATION (BAZETT): 455 MS
EKG R AXIS: 43 DEGREES
EKG R AXIS: 73 DEGREES
EKG T AXIS: 75 DEGREES
EKG T AXIS: 75 DEGREES
EKG VENTRICULAR RATE: 53 BPM
EKG VENTRICULAR RATE: 54 BPM

## 2023-11-23 PROCEDURE — 2580000003 HC RX 258

## 2023-11-23 PROCEDURE — 99239 HOSP IP/OBS DSCHRG MGMT >30: CPT | Performed by: INTERNAL MEDICINE

## 2023-11-23 PROCEDURE — 6370000000 HC RX 637 (ALT 250 FOR IP)

## 2023-11-23 PROCEDURE — 93005 ELECTROCARDIOGRAM TRACING: CPT | Performed by: INTERNAL MEDICINE

## 2023-11-23 PROCEDURE — 93010 ELECTROCARDIOGRAM REPORT: CPT | Performed by: INTERNAL MEDICINE

## 2023-11-23 PROCEDURE — 6370000000 HC RX 637 (ALT 250 FOR IP): Performed by: NURSE PRACTITIONER

## 2023-11-23 PROCEDURE — 93270 REMOTE 30 DAY ECG REV/REPORT: CPT

## 2023-11-23 RX ORDER — SOTALOL HYDROCHLORIDE 80 MG/1
80 TABLET ORAL 2 TIMES DAILY
Qty: 6 TABLET | Refills: 0 | Status: SHIPPED | OUTPATIENT
Start: 2023-11-23 | End: 2023-11-26

## 2023-11-23 RX ORDER — SOTALOL HYDROCHLORIDE 80 MG/1
80 TABLET ORAL 2 TIMES DAILY
Qty: 6 TABLET | Refills: 0 | Status: SHIPPED | OUTPATIENT
Start: 2023-11-23 | End: 2023-11-23 | Stop reason: HOSPADM

## 2023-11-23 RX ADMIN — SOTALOL HYDROCHLORIDE 80 MG: 80 TABLET ORAL at 07:52

## 2023-11-23 RX ADMIN — HYDRALAZINE HYDROCHLORIDE 25 MG: 25 TABLET, FILM COATED ORAL at 07:52

## 2023-11-23 RX ADMIN — SODIUM CHLORIDE, PRESERVATIVE FREE 10 ML: 5 INJECTION INTRAVENOUS at 07:52

## 2023-11-23 RX ADMIN — TICAGRELOR 90 MG: 90 TABLET ORAL at 07:52

## 2023-11-23 NOTE — PROCEDURES
12 lead EKG completed. [facility] [place] [facility]  [place] C[facility]

## 2023-11-23 NOTE — DISCHARGE SUMMARY
Hospital Medicine Discharge Summary      Patient Identification:   Florence Arredondo   : 1935  MRN: [id number]   Account: [de-identified]      Patient's PCP: Jessica Mancera Date: 2023     Discharge Date:   2023    Admitting Physician: No admitting provider for patient encounter. Discharge Physician: Snow Gatica DO     Discharge Diagnoses: Active Hospital Problems    Diagnosis Date Noted    SVT (supraventricular tachycardia) [I47.10] 2023       The patient was seen and examined on day of discharge and this discharge summary is in conjunction with any daily progress note from day of discharge. Hospital Course:   Florence Arredondo is a 82 y.o. male admitted to [facility] on 2023 for palpitations. Palpitations: Patient reports new-onset palpitations concurrent with transitory tachycardia at [facility] that improved with IV lopressor. EKG from  reported supraventricular tachycardia, but cannot be directly visualized. Suspect that this may be also due to A. Fib with RVR episode as SVT does not typically improve with IV Lopressor. Of note, the patient has sinus bradycardia with HR in the 50s when not in tachycardia, raising concern for sick sinus/tachy-madai syndrome. Ischemic heart disease does raise risk for palpitations; however, patient recently had 2 PCI/DONOVAN done on  with no other cardiac symptoms making this less likely. No evidence to suggest active infection or significant electrolyte abnormalities at this time. No obvious triggers such as caffeine, smoking, alcohol, stress, or illicit drug use. Recent TSH on 23 WNL. - Pacemaker to bedside.  - Mg ordered  - Hold verapamil due to bradycardia. - Continue to monitor on telemetry  - Consult Cardiology for potential pacemaker placement. Paroxysmal Atrial Fibrillation: Noted in history. Patient's palpitations may potentially be due to A. Fib with RVR episodes.  Ange Tan

## 2023-11-23 NOTE — PLAN OF CARE
Name: Andrew Bingham  YOB: 1935  MRN: [id number]  Date: 11/20/23     Plan of Care        RN notified of patient of abnormal rhythm on telemetry. Patient was tachycardic in the 130s. RN gave 1 time dose of IV lopressor 5mg PRN medication that was placed during admission. HR remained elevated in the 120s after induction of medication. Physician at bedside to evaluate patient, HR spontaneously improved to 65. High suspicion for Tachy Aj syndrome. ICU notified of patients suspected diagnosis. Held all AV emilee blocking agents. Electrolytes pending however normal electrolytes were noted yesterday. Will continue monitor patient with low threshold to transfer patient to the ICU for higher level of care.      Electronically signed by Stacia Short DO on 11/20/23 at 4:30 AM EST
Problem: Discharge Planning  Goal: Discharge to home or other facility with appropriate resources  11/20/2023 0132 by Jae De Leon RN  Outcome: Progressing  Flowsheets (Taken 11/20/2023 0132)  Discharge to home or other facility with appropriate resources:   Identify barriers to discharge with patient and caregiver   Identify discharge learning needs (meds, wound care, etc)     Problem: Safety - Adult  Goal: Free from fall injury  11/20/2023 0132 by Jae De Leon RN  Outcome: Progressing  Note: Bed locked & in low position, call light in reach, side-rails up x2, bed/chair alarm utilized, non-slip socks on when ambulating, reminded patient to use call light to call for assistance. Problem: ABCDS Injury Assessment  Goal: Absence of physical injury  11/20/2023 0132 by Jae De Leon RN  Outcome: Progressing  Flowsheets (Taken 11/20/2023 0132)  Absence of Physical Injury: Implement safety measures based on patient assessment     Problem: Cardiovascular - Adult  Goal: Maintains optimal cardiac output and hemodynamic stability  11/20/2023 0132 by Jae De Leon RN  Outcome: Progressing  Flowsheets (Taken 11/20/2023 0132)  Maintains optimal cardiac output and hemodynamic stability:   Monitor blood pressure and heart rate   Monitor urine output and notify Licensed Independent Practitioner for values outside of normal range   Assess for signs of decreased cardiac output     Problem: Cardiovascular - Adult  Goal: Absence of cardiac dysrhythmias or at baseline  11/20/2023 0132 by Jae De Leon RN  Outcome: Progressing  Flowsheets (Taken 11/20/2023 0132)  Absence of cardiac dysrhythmias or at baseline:   Monitor cardiac rate and rhythm   Assess for signs of decreased cardiac output   Care plan reviewed with patient. Patient verbalizes understanding of the care plan and contributed to goal setting.
Problem: Discharge Planning  Goal: Discharge to home or other facility with appropriate resources  11/22/2023 0917 by Niru King RN  Outcome: Progressing  Flowsheets (Taken 11/22/2023 0551)  Discharge to home or other facility with appropriate resources:   Identify barriers to discharge with patient and caregiver   Arrange for needed discharge resources and transportation as appropriate   Identify discharge learning needs (meds, wound care, etc)     Problem: Safety - Adult  Goal: Free from fall injury  11/22/2023 0917 by Niru King RN  Outcome: Progressing  Flowsheets (Taken 11/22/2023 0917)  Free From Fall Injury: Instruct family/caregiver on patient safety     Problem: ABCDS Injury Assessment  Goal: Absence of physical injury  11/22/2023 0917 by Niru King RN  Outcome: Progressing  Flowsheets (Taken 11/22/2023 0917)  Absence of Physical Injury: Implement safety measures based on patient assessment     Problem: Cardiovascular - Adult  Goal: Maintains optimal cardiac output and hemodynamic stability  11/22/2023 0917 by Niru King RN  Outcome: Progressing  Flowsheets (Taken 11/22/2023 0917)  Maintains optimal cardiac output and hemodynamic stability:   Assess for signs of decreased cardiac output   Monitor urine output and notify Licensed Independent Practitioner for values outside of normal range     Problem: Cardiovascular - Adult  Goal: Absence of cardiac dysrhythmias or at baseline  11/22/2023 0917 by Niru King RN  Outcome: Progressing  Flowsheets (Taken 11/22/2023 0917)  Absence of cardiac dysrhythmias or at baseline:   Monitor cardiac rate and rhythm   Assess for signs of decreased cardiac output     Problem: Skin/Tissue Integrity - Adult  Goal: Skin integrity remains intact  11/22/2023 0917 by Niru King RN  Outcome: Progressing  Flowsheets (Taken 11/22/2023 0917)  Skin Integrity Remains Intact: Monitor for areas of redness and/or skin breakdown     Problem: Cardiovascular -
Problem: Discharge Planning  Goal: Discharge to home or other facility with appropriate resources  Outcome: Progressing  Flowsheets (Taken 11/19/2023 1422)  Discharge to home or other facility with appropriate resources: Identify barriers to discharge with patient and caregiver  Note: He is from home with his wife and plans on returning there at discharge. Problem: Safety - Adult  Goal: Free from fall injury  Outcome: Progressing  Flowsheets (Taken 11/19/2023 1422)  Free From Fall Injury: Instruct family/caregiver on patient safety  Note: Bed locked & in low position, call light in reach, side-rails up x2, bed/chair alarm utilized, non-slip socks on when ambulating, reminded patient to use call light to call for assistance. Problem: ABCDS Injury Assessment  Goal: Absence of physical injury  Outcome: Progressing  Flowsheets (Taken 11/19/2023 1300)  Absence of Physical Injury: Implement safety measures based on patient assessment     Problem: Cardiovascular - Adult  Goal: Maintains optimal cardiac output and hemodynamic stability  Outcome: Progressing  Flowsheets (Taken 11/19/2023 1422)  Maintains optimal cardiac output and hemodynamic stability: Monitor blood pressure and heart rate  Note: Ongoing assessment & interventions provided throughout shift. Patient on continuous telemetry monitoring, heart tones, vitals & pulses checked at least 3 times per shift & PRN. Vitals within acceptable limits. Peripheral pulses palpable. Goal: Absence of cardiac dysrhythmias or at baseline  Outcome: Progressing     Problem: Skin/Tissue Integrity - Adult  Goal: Skin integrity remains intact  Outcome: Progressing  Flowsheets (Taken 11/19/2023 1422)  Skin Integrity Remains Intact: Monitor for areas of redness and/or skin breakdown  Note: Ongoing assessment & interventions provided throughout shift. Skin assessments provided. Encouraging/assisting patient to turn as needed. Care plan reviewed with patient.
Problem: Discharge Planning  Goal: Discharge to home or other facility with appropriate resources  Outcome: Progressing  Flowsheets (Taken 11/21/2023 0612)  Discharge to home or other facility with appropriate resources: Identify barriers to discharge with patient and caregiver     Problem: Safety - Adult  Goal: Free from fall injury  Outcome: Progressing  Flowsheets (Taken 11/21/2023 0612)  Free From Fall Injury: Instruct family/caregiver on patient safety  Note: Bed locked & in low position, call light in reach, side-rails up x2, bed/chair alarm utilized, non-slip socks on when ambulating, reminded patient to use call light to call for assistance. Problem: ABCDS Injury Assessment  Goal: Absence of physical injury  Outcome: Progressing  Flowsheets (Taken 11/21/2023 0612)  Absence of Physical Injury: Implement safety measures based on patient assessment     Problem: Cardiovascular - Adult  Goal: Maintains optimal cardiac output and hemodynamic stability  Outcome: Progressing  Flowsheets (Taken 11/21/2023 0612)  Maintains optimal cardiac output and hemodynamic stability:   Monitor blood pressure and heart rate   Assess for signs of decreased cardiac output     Problem: Cardiovascular - Adult  Goal: Absence of cardiac dysrhythmias or at baseline  Outcome: Progressing  Flowsheets (Taken 11/21/2023 0612)  Absence of cardiac dysrhythmias or at baseline:   Monitor cardiac rate and rhythm   Assess for signs of decreased cardiac output     Problem: Skin/Tissue Integrity - Adult  Goal: Skin integrity remains intact  Outcome: Progressing   Care plan reviewed with patient. Patient verbalizes understanding of the care plan and contributed to goal setting.
Problem: Discharge Planning  Goal: Discharge to home or other facility with appropriate resources  Outcome: Progressing  Flowsheets (Taken 11/23/2023 0115)  Discharge to home or other facility with appropriate resources:   Identify barriers to discharge with patient and caregiver   Identify discharge learning needs (meds, wound care, etc)     Problem: Safety - Adult  Goal: Free from fall injury  Outcome: Progressing  Note: Bed locked & in low position, call light in reach, side-rails up x2, bed/chair alarm utilized, non-slip socks on when ambulating, reminded patient to use call light to call for assistance. Problem: ABCDS Injury Assessment  Goal: Absence of physical injury  Outcome: Progressing  Flowsheets (Taken 11/23/2023 0115)  Absence of Physical Injury: Implement safety measures based on patient assessment     Problem: Cardiovascular - Adult  Goal: Maintains optimal cardiac output and hemodynamic stability  Outcome: Progressing  Flowsheets (Taken 11/23/2023 0115)  Maintains optimal cardiac output and hemodynamic stability:   Monitor blood pressure and heart rate   Monitor urine output and notify Licensed Independent Practitioner for values outside of normal range   Assess for signs of decreased cardiac output     Problem: Cardiovascular - Adult  Goal: Absence of cardiac dysrhythmias or at baseline  Outcome: Progressing  Flowsheets (Taken 11/23/2023 0115)  Absence of cardiac dysrhythmias or at baseline:   Monitor cardiac rate and rhythm   Assess for signs of decreased cardiac output   Administer antiarrhythmia medication and electrolyte replacement as ordered     Problem: Skin/Tissue Integrity - Adult  Goal: Skin integrity remains intact  Outcome: Progressing  Flowsheets (Taken 11/23/2023 0115)  Skin Integrity Remains Intact: Monitor for areas of redness and/or skin breakdown   Care plan reviewed with patient. Patient verbalizes understanding of the care plan and contributed to goal setting.
understanding of the care plan and contributed to goal setting.

## 2023-11-24 LAB
EKG ATRIAL RATE: 55 BPM
EKG P AXIS: 50 DEGREES
EKG P-R INTERVAL: 162 MS
EKG Q-T INTERVAL: 482 MS
EKG QRS DURATION: 94 MS
EKG QTC CALCULATION (BAZETT): 461 MS
EKG R AXIS: 56 DEGREES
EKG T AXIS: 53 DEGREES
EKG VENTRICULAR RATE: 55 BPM

## 2023-11-27 NOTE — TELEPHONE ENCOUNTER
Dr. Frieda Medel   Did you want this patient scheduled for EPS/ SVT ablation? Or 4 week follow up after wearing a 2 week event monitor/ starting Sotalol. ?- consult note 11.22.23 and 11.23.2023      Please advise. Apt? Ablation?

## 2023-11-28 NOTE — TELEPHONE ENCOUNTER
LMOM  Patient is to wear the monitor   Schedule EPS/ SVT ablation in January     How many days would you like the patient to hold the Sotalol?   Continue Brilinta s/p  PCI 11.09.2023

## 2023-12-10 LAB — ECHO BSA: 2.16 M2

## 2023-12-13 ENCOUNTER — TELEPHONE (OUTPATIENT)
Dept: CARDIOLOGY CLINIC | Age: 88
End: 2023-12-13

## 2023-12-13 NOTE — TELEPHONE ENCOUNTER
Please see event monitor results  Patient is scheduled for SVT ablation on 1.16.2024    Anything else needed at this time? The patient was monitored for 95% of prescribed. The rhythm is predominantly sinus with heart rate varying between 42 bpm (11/26/2023 at 5:32 AM) and 142 bpm (203 2023 at 3:47 AM.  The average heart rate was 53 bpm.  2 automatically triggered events for sinus bradycardia noted on 11/26/2023 and 5:32 AM (heart rate 42 bpm) and 12/5/2023 at 9:44 AM (heart rate 43 beats per). 2 runs of narrow complex tachycardia, rate 142 bpm, likely atrial flutter/atrial tachycardia noted on 11/26/2023 and 1:12 AM and 2/3/2023 at 3:47 AM.  No significant pauses. No symptoms reported by the patient.

## 2024-01-15 ENCOUNTER — PREP FOR PROCEDURE (OUTPATIENT)
Dept: CARDIOLOGY | Age: 89
End: 2024-01-15

## 2024-01-15 RX ORDER — SODIUM CHLORIDE 0.9 % (FLUSH) 0.9 %
5-40 SYRINGE (ML) INJECTION EVERY 12 HOURS SCHEDULED
Status: CANCELLED | OUTPATIENT
Start: 2024-01-15

## 2024-01-15 RX ORDER — SODIUM CHLORIDE 0.9 % (FLUSH) 0.9 %
5-40 SYRINGE (ML) INJECTION PRN
Status: CANCELLED | OUTPATIENT
Start: 2024-01-15

## 2024-01-15 RX ORDER — SODIUM CHLORIDE 9 MG/ML
INJECTION, SOLUTION INTRAVENOUS PRN
Status: CANCELLED | OUTPATIENT
Start: 2024-01-15

## 2024-01-16 ENCOUNTER — HOSPITAL ENCOUNTER (OUTPATIENT)
Age: 89
Setting detail: OUTPATIENT SURGERY
Discharge: HOME | End: 2024-01-16
Attending: INTERNAL MEDICINE | Admitting: INTERNAL MEDICINE
Payer: MEDICARE

## 2024-01-16 VITALS
SYSTOLIC BLOOD PRESSURE: 156 MMHG | RESPIRATION RATE: 16 BRPM | HEART RATE: 59 BPM | BODY MASS INDEX: 30.96 KG/M2 | WEIGHT: 209 LBS | DIASTOLIC BLOOD PRESSURE: 69 MMHG | HEIGHT: 69 IN | TEMPERATURE: 97.6 F | OXYGEN SATURATION: 98 %

## 2024-01-16 DIAGNOSIS — I47.10 SVT (SUPRAVENTRICULAR TACHYCARDIA): ICD-10-CM

## 2024-01-16 LAB
ABO: NORMAL
ANION GAP SERPL CALC-SCNC: 9 MEQ/L (ref 8–16)
ANTIBODY SCREEN: NORMAL
APTT PPP: 31.9 SECONDS (ref 22–38)
BUN SERPL-MCNC: 24 MG/DL (ref 7–22)
CALCIUM SERPL-MCNC: 9.4 MG/DL (ref 8.5–10.5)
CHLORIDE SERPL-SCNC: 103 MEQ/L (ref 98–111)
CO2 SERPL-SCNC: 26 MEQ/L (ref 23–33)
CREAT SERPL-MCNC: 1.2 MG/DL (ref 0.4–1.2)
DEPRECATED RDW RBC AUTO: 47.4 FL (ref 35–45)
ERYTHROCYTE [DISTWIDTH] IN BLOOD BY AUTOMATED COUNT: 13.8 % (ref 11.5–14.5)
GFR SERPL CREATININE-BSD FRML MDRD: 59 ML/MIN/1.73M2
GLUCOSE SERPL-MCNC: 140 MG/DL (ref 70–108)
HCT VFR BLD AUTO: 50.3 % (ref 42–52)
HGB BLD-MCNC: 16.5 GM/DL (ref 14–18)
INR PPP: 0.97 (ref 0.85–1.13)
MCH RBC QN AUTO: 30.7 PG (ref 26–33)
MCHC RBC AUTO-ENTMCNC: 32.8 GM/DL (ref 32.2–35.5)
MCV RBC AUTO: 93.7 FL (ref 80–94)
PLATELET # BLD AUTO: 161 THOU/MM3 (ref 130–400)
PMV BLD AUTO: 11 FL (ref 9.4–12.4)
POTASSIUM SERPL-SCNC: 4.9 MEQ/L (ref 3.5–5.2)
RBC # BLD AUTO: 5.37 MILL/MM3 (ref 4.7–6.1)
RH FACTOR: NORMAL
SODIUM SERPL-SCNC: 138 MEQ/L (ref 135–145)
WBC # BLD AUTO: 7.5 THOU/MM3 (ref 4.8–10.8)

## 2024-01-16 PROCEDURE — 6360000002 HC RX W HCPCS: Performed by: INTERNAL MEDICINE

## 2024-01-16 PROCEDURE — 2580000003 HC RX 258: Performed by: STUDENT IN AN ORGANIZED HEALTH CARE EDUCATION/TRAINING PROGRAM

## 2024-01-16 PROCEDURE — 93005 ELECTROCARDIOGRAM TRACING: CPT | Performed by: STUDENT IN AN ORGANIZED HEALTH CARE EDUCATION/TRAINING PROGRAM

## 2024-01-16 PROCEDURE — 85730 THROMBOPLASTIN TIME PARTIAL: CPT

## 2024-01-16 PROCEDURE — 99152 MOD SED SAME PHYS/QHP 5/>YRS: CPT | Performed by: INTERNAL MEDICINE

## 2024-01-16 PROCEDURE — 2720000010 HC SURG SUPPLY STERILE: Performed by: INTERNAL MEDICINE

## 2024-01-16 PROCEDURE — 7100000010 HC PHASE II RECOVERY - FIRST 15 MIN: Performed by: INTERNAL MEDICINE

## 2024-01-16 PROCEDURE — 93653 COMPRE EP EVAL TX SVT: CPT | Performed by: INTERNAL MEDICINE

## 2024-01-16 PROCEDURE — 86850 RBC ANTIBODY SCREEN: CPT

## 2024-01-16 PROCEDURE — C1894 INTRO/SHEATH, NON-LASER: HCPCS | Performed by: INTERNAL MEDICINE

## 2024-01-16 PROCEDURE — 85027 COMPLETE CBC AUTOMATED: CPT

## 2024-01-16 PROCEDURE — 80048 BASIC METABOLIC PNL TOTAL CA: CPT

## 2024-01-16 PROCEDURE — C1732 CATH, EP, DIAG/ABL, 3D/VECT: HCPCS | Performed by: INTERNAL MEDICINE

## 2024-01-16 PROCEDURE — 7100000011 HC PHASE II RECOVERY - ADDTL 15 MIN: Performed by: INTERNAL MEDICINE

## 2024-01-16 PROCEDURE — 86901 BLOOD TYPING SEROLOGIC RH(D): CPT

## 2024-01-16 PROCEDURE — 99153 MOD SED SAME PHYS/QHP EA: CPT | Performed by: INTERNAL MEDICINE

## 2024-01-16 PROCEDURE — 36415 COLL VENOUS BLD VENIPUNCTURE: CPT

## 2024-01-16 PROCEDURE — 2709999900 HC NON-CHARGEABLE SUPPLY: Performed by: INTERNAL MEDICINE

## 2024-01-16 PROCEDURE — 93005 ELECTROCARDIOGRAM TRACING: CPT | Performed by: INTERNAL MEDICINE

## 2024-01-16 PROCEDURE — 85610 PROTHROMBIN TIME: CPT

## 2024-01-16 PROCEDURE — 2500000003 HC RX 250 WO HCPCS: Performed by: INTERNAL MEDICINE

## 2024-01-16 PROCEDURE — 2580000003 HC RX 258: Performed by: INTERNAL MEDICINE

## 2024-01-16 PROCEDURE — C1730 CATH, EP, 19 OR FEW ELECT: HCPCS | Performed by: INTERNAL MEDICINE

## 2024-01-16 PROCEDURE — 86900 BLOOD TYPING SEROLOGIC ABO: CPT

## 2024-01-16 RX ORDER — SODIUM CHLORIDE 9 MG/ML
INJECTION, SOLUTION INTRAVENOUS PRN
Status: DISCONTINUED | OUTPATIENT
Start: 2024-01-16 | End: 2024-01-16 | Stop reason: HOSPADM

## 2024-01-16 RX ORDER — MIDAZOLAM HYDROCHLORIDE 1 MG/ML
INJECTION INTRAMUSCULAR; INTRAVENOUS PRN
Status: DISCONTINUED | OUTPATIENT
Start: 2024-01-16 | End: 2024-01-16 | Stop reason: HOSPADM

## 2024-01-16 RX ORDER — SODIUM CHLORIDE 0.9 % (FLUSH) 0.9 %
5-40 SYRINGE (ML) INJECTION PRN
Status: DISCONTINUED | OUTPATIENT
Start: 2024-01-16 | End: 2024-01-16 | Stop reason: HOSPADM

## 2024-01-16 RX ORDER — SODIUM CHLORIDE 0.9 % (FLUSH) 0.9 %
5-40 SYRINGE (ML) INJECTION EVERY 12 HOURS SCHEDULED
Status: DISCONTINUED | OUTPATIENT
Start: 2024-01-16 | End: 2024-01-16 | Stop reason: HOSPADM

## 2024-01-16 RX ORDER — FENTANYL CITRATE 50 UG/ML
INJECTION, SOLUTION INTRAMUSCULAR; INTRAVENOUS PRN
Status: DISCONTINUED | OUTPATIENT
Start: 2024-01-16 | End: 2024-01-16 | Stop reason: HOSPADM

## 2024-01-16 RX ADMIN — SODIUM CHLORIDE: 9 INJECTION, SOLUTION INTRAVENOUS at 07:17

## 2024-01-16 NOTE — DISCHARGE INSTRUCTIONS
ACTIVITY:   Do your  normal activities except:         *No heavy lifting more than 10 pounds for 3 days.         *No strenous activity for 7days. No straining to have a BM or reaching to floor from standing or sitting position.         *No driving for 24 hours.  2.  You may shower in the morning, but no tub baths for 3 days.  3.  Ask your doctor when you can return to work.  4.  Remove the bandages from the puncture sites the next day if they have not already fallen off.  5.  You may cover the site with a regular bandage for another day to keep the site clean and dry.    DIET :  You may resume your previous diet.    CARE OF ACCESS SITE (GROIN AND NECK):   Examine the puncture site daily until it is well-healed.  Some bruising is expected and will slowly disappear.  Minor pain/soreness is also normal    MEDICATIONS:  Follow the schedule of medicines given by Dr Denisse FIELDS TO REPORT TO YOUR DOCTOR     Fever, swelling, bleeding.   Redness at the puncture site.   Warm or hot sensation at puncture sites.   Purulence or drainage of fluid from the puncture sites.   Numbness, tingling or coldness in the affected leg    APPOINTMENTS:    Call your doctor at the following number to set up an appointment for one month after.    CALL YOUR DOCTOR IMMEDIATELY:    If you have a fever, drainage from your puncture site, persistent chest discomfort, or uncontrolled heart rate.  If you cannot contact your doctor, go to the nearest emergency room.

## 2024-01-16 NOTE — BRIEF OP NOTE
Brief Postoperative Note    Date:   1/16/2024  Patient name: Dany Duran  YOB: 1935  Sex: male   MRN:   [id number]    PCP: Fili Poe     Procedure:SVT ablation.    Pre-Op Diagnosis: SVT    Post-Op Diagnosis: AVNRT    Surgeon: Jose Antonio Salcedo MD, MRCP, FACC, FHRS    Assistant: Cuauhtemoc    Anesthesia/sedation:  Local lidocaine/fentanyl and midazolam as needed.     Estimated Blood Loss (mL): Minimal    Complications: None    Recommendations:  See orders in Epic.  Bed rest for 2 hours.  Watch access site/s for bleeding or swelling.  Hold pressure if bleeding or swelling.  Ambulate 2 hour after suture/sheath removal.  Remove Rowe's catheter (if in place) once patient ambulates.  Stop IV fluids once patient starts eating.  Resume home medications as tonight.   Follow up in 4 weeks in EP clinic.           Electronically signed by Jose Antonio Salcedo MD, FACC, FHRS on 1/16/2024 at 9:15 AM

## 2024-01-16 NOTE — H&P
assessed.   Comment:    [x]The patient is an appropriate candidate to undergo the planned procedure sedation and anesthesia. (Refer to nursing sedation/analgesia documentation record)  [x]Formulation and discussion of sedation/procedure plan, risks, and expectations with patient and/or responsible adult completed.  [x]Patient examined immediately prior to the procedure. (Refer to nursing sedation/analgesia documentation record)        Jose Antonio Salcedo MD MD [facility], [facility]  Electronically signed 1/16/2024 at 7:21 AM

## 2024-01-16 NOTE — PROCEDURES
[facility]  HEART CENTER (EP)  [address] [address]  Dept: [phone]  CARDIAC ELECTROPHYSIOLOGY: PROCEDURE NOTE  Patient Demographics:  Date:   1/16/2024  Patient name:              Dany Duran  YOB: 1935  Sex: male   MRN:   [id number]    Cardiac Electrophysiologist:  Jose Antonio Salcedo MD, MRCP, FACC, FHRS     Primary Care Provider:    Fili Poe Hu     Cardiologist:   Alfonso Amanda MD    Procedure Planned:  Electrophysiology study and supraventricular tachycardia (SVT) ablation.     Preoperative Diagnosis:  Narrow complex tachycardia.     Postoperative Diagnosis:  Typical (slow-fast) AV emilee reentrant tachycardia.      Brief Clinical Summary:  88/M with recurrent palpitations due to narrow complex tachycardia, failed medical therapy. electively presents for EP study and arrhythmia ablation. Medical history: Supraventricular tachycardia dx 1985 refractory to verapamil, metoprolol and Sotalol, CAD/PTCA (1984), PCI-LAD and LCX and PTCA-OM1 (11/12/23).  Low normal LV systolic function  (EF 45 to 50%), mild MR, hyperlipidemia, history of CVA (no deficits), obesity, history of bladder cancer/cystectomy and ileal conduit, and GERD.     Procedure Performed:  Comprehensive electrophysiology study with isoproterenol.  3D electro anatomical mapping ([employer]).  Slow pathway ablation.      Description of the procedure:  The patient was brought to the electrophysiology lab in a fasting and non-sedated status. He was prepped and draped in the usual sterile fashion. Intravenous Fentanyl and Midazolam as needed was used for analgesia and conscious sedation. Lidocaine, 2% was injected into femoral regions and right side of the neck for local anesthesia. Vascular access was obtained under ultrasound guidance using 21G micropuncture needle and hemostatic short sheaths were placed over J-tip guidewires (9 and 7-French right femoral vein, 7-French

## 2024-01-16 NOTE — FLOWSHEET NOTE
0500 Patient admitted to 2E11   Ambulatory for SVT ablation.  Patient NPO. Patient accompanied by spouse and adult children  Vital signs obtained.   Assessment and data collection intiated.   Oriented to room.  Policies and procedures for 2E explained.   All questions answered with no further questions at this time.   Fall precautions reviewed with patient.     0500 Care plan reviewed with patient and spouse.  Patient and family verbalize understanding of the plan of care and contribute to goal setting.       0715 to cath lab per bed, stable condition.     0925 care taken over from cath lab, site with dressing to RIJ, RFV, & LFV dry and intact. Patient reinstructed not to bend groin, not to cross legs, not to lift head off of pillow, if laughs or coughs to hold site for reinforcement. Voices understanding , taking water without difficulty.    1030 HOB elevated to 30 degrees.  Patient tolerating water and ordered lunch.     1125 patient ambulated in casas, tolerated activity well.     1230 patient ambulated in casas, tolerated activity well        1300 up in room, tolerated activity well.     1315 Discharge instructions given.   Patient goals/plan/treatment preferences discussed by this RN.  Discharge planning provided by this RN.  Patient goals/plan/treatment preferences reviewed with patient/family.  Patient/family verbalize understanding of discharge plan and are in agreement with goal/plan/treatment preferences.  Understanding was demonstrated using the teach back method.  AVS provided by this RN at time of discharge, which includes all necessary medical information pertaining to the patients current course of illness, treatment, post-discharge goals of care, and treatment preferences.     1320 Discharged per wheelchair, stable condition.

## 2024-01-17 ENCOUNTER — TELEPHONE (OUTPATIENT)
Dept: CARDIOLOGY CLINIC | Age: 89
End: 2024-01-17

## 2024-01-17 LAB — ECHO BSA: 2.15 M2

## 2024-01-17 NOTE — TELEPHONE ENCOUNTER
..POD 1 SVT ablation yesterday     Concerned to take full dose of metoprolol 25 bid, states HR is 50's and 60's.  States he will take half a tab twice a day to see how his HR does.  Aware we would want him to increase that to 25mg bid within a day or two.  Aware to call office if he cannot tolerate the 12.5 bid.         Denies any bleeding/drainage/ hematoma at IJ and bilateral groin punctures     Aware to call office with issues

## 2024-01-17 NOTE — TELEPHONE ENCOUNTER
S/P SVT ABLATION FROM YESTERDAY  LMOM FOR TO CALL THIS OFFICE                Summary:  Typical (slow-fast) AV emilee reentrant tachycardia,  Successful slow pathway ablation.  Normal post ablation sinus node, AV emilee and His-Purkinje function.     Recommendations:  Aspirin 81 mg daily.  Discontinue Sotalol  Discharge and post-operative care per protocol.          Electronically signed by Jose Antonio Salcedo MD, [facility], [facility] on 1/16/2024 at 9:24 AM

## 2024-01-21 LAB
EKG ATRIAL RATE: 68 BPM
EKG ATRIAL RATE: 68 BPM
EKG P AXIS: 47 DEGREES
EKG P AXIS: 52 DEGREES
EKG P-R INTERVAL: 166 MS
EKG P-R INTERVAL: 168 MS
EKG Q-T INTERVAL: 418 MS
EKG Q-T INTERVAL: 418 MS
EKG QRS DURATION: 88 MS
EKG QRS DURATION: 90 MS
EKG QTC CALCULATION (BAZETT): 444 MS
EKG QTC CALCULATION (BAZETT): 444 MS
EKG R AXIS: 58 DEGREES
EKG R AXIS: 65 DEGREES
EKG T AXIS: 86 DEGREES
EKG T AXIS: 96 DEGREES
EKG VENTRICULAR RATE: 68 BPM
EKG VENTRICULAR RATE: 68 BPM

## 2024-02-20 NOTE — PATIENT INSTRUCTIONS
You may receive a survey regarding the care you received during your visit.  Your input is valuable to us.  We encourage you to complete and return your survey.  We hope you will choose us in the future for your healthcare needs.    Thank you for choosing [facility]!    Your Medical Assistant Today:  Sandi Contreras LPN Today:  Kaylen  Your Provider for Today: Dr. Salcedo  Ph. [phone]

## 2024-02-21 ENCOUNTER — OFFICE VISIT (OUTPATIENT)
Dept: CARDIOLOGY CLINIC | Age: 89
End: 2024-02-21
Payer: MEDICARE

## 2024-02-21 VITALS
SYSTOLIC BLOOD PRESSURE: 142 MMHG | BODY MASS INDEX: 32.11 KG/M2 | DIASTOLIC BLOOD PRESSURE: 69 MMHG | HEIGHT: 69 IN | WEIGHT: 216.8 LBS | HEART RATE: 62 BPM

## 2024-02-21 DIAGNOSIS — Z86.79 S/P ABLATION OF ATRIAL FIBRILLATION: Primary | ICD-10-CM

## 2024-02-21 DIAGNOSIS — Z98.890 S/P ABLATION OF ATRIAL FIBRILLATION: Primary | ICD-10-CM

## 2024-02-21 DIAGNOSIS — I47.10 SVT (SUPRAVENTRICULAR TACHYCARDIA): ICD-10-CM

## 2024-02-21 PROCEDURE — G8427 DOCREV CUR MEDS BY ELIG CLIN: HCPCS | Performed by: INTERNAL MEDICINE

## 2024-02-21 PROCEDURE — G8484 FLU IMMUNIZE NO ADMIN: HCPCS | Performed by: INTERNAL MEDICINE

## 2024-02-21 PROCEDURE — 99214 OFFICE O/P EST MOD 30 MIN: CPT | Performed by: INTERNAL MEDICINE

## 2024-02-21 PROCEDURE — G8417 CALC BMI ABV UP PARAM F/U: HCPCS | Performed by: INTERNAL MEDICINE

## 2024-02-21 PROCEDURE — 1123F ACP DISCUSS/DSCN MKR DOCD: CPT | Performed by: INTERNAL MEDICINE

## 2024-02-21 PROCEDURE — 1036F TOBACCO NON-USER: CPT | Performed by: INTERNAL MEDICINE

## 2024-02-21 PROCEDURE — 93000 ELECTROCARDIOGRAM COMPLETE: CPT | Performed by: INTERNAL MEDICINE

## 2024-02-21 NOTE — PROGRESS NOTES
[facility]   Heart Center (EP)  [address] [address]  Dept: [phone]  Cardiac Electrophysiology: Follow up Note  Patient's demographics:  Date:   2/21/2024  Patient name:              Dany Duran  YOB: 1935  Sex:    male   MRN:   [id number]    Primary Care Physician:  Fili Poe Hu    Cardiologist:  Matty Puente MD    Reason for Consultation:  Follow up SVT ablation.     Clinical Summary:  88/M with recurrent palpitations due to narrow complex tachycardia, failed medical therapy. Underwent EP study on 1/17/2024 and noted to have typical AVNRT. Underwent successful slow pathway ablation.     Doing well.  No more palpitations.  Off sotalol.  No new complaints.  No groin complications. Medical history: Supraventricular tachycardia dx 1985 refractory to verapamil, metoprolol and Sotalol => EP study typical; AVNRT => successful slow pathway ablation (1/16/24), CAD/PTCA (1984), PCI-LAD and LCX and PTCA-OM1 (11/12/23).  low normal LV systolic function  (EF 45 to 50%), mild MR, hyperlipidemia, history of CVA (no deficits), obesity, history of bladder cancer/cystectomy and ileal conduit, and GERD.    Review of systems:  Constitutional: Negative for chills and fever  HENT: Negative for congestion, sinus pressure, sneezing and sore throat.    Eyes: Negative for pain, discharge, redness and itching.   Respiratory: Negative for apnea, cough  Gastrointestinal: Negative for blood in stool, constipation, diarrhea   Endocrine: Negative for cold intolerance, heat intolerance, polydipsia.  Genitourinary: Negative for dysuria, enuresis, flank pain and hematuria.   Musculoskeletal: Negative for arthralgias, joint swelling and neck pain.   Neurological: Negative for numbness and headaches.   Psychiatric/Behavioral: Negative for agitation, confusion, decreased concentration and dysphoric mood.      Past Medical History::  Past Medical History:   Diagnosis Date

## 2024-06-24 NOTE — TELEPHONE ENCOUNTER
Dany Duran called requesting a refill on the following medications:  Requested Prescriptions     Pending Prescriptions Disp Refills    metoprolol tartrate (LOPRESSOR) 25 MG tablet 180 tablet 1     Sig: Take 1 tablet by mouth 2 times daily     Pharmacy verified: [facility] in [place]  .sen      Date of last visit: 2/21/2024  Date of next visit (if applicable): 2/19/2025

## (undated) DEVICE — 4F (1.0MM ID) X 9CM STIFF MICRO-STICK® INTRODUCER SET WITH NITINOL GUIDEWIRE WITH RADIOPAQUE TIP: Brand: MICRO-STICK SETMICRO-STICK SET

## (undated) DEVICE — EP RECORDING CATHETER 14 POLE, FIXED CURVE: Brand: EP RECORDING CATHETER

## (undated) DEVICE — TUBING PMP FOR CARTO SYS SMARTABLATE

## (undated) DEVICE — CATHETER ABLAT 8FR L115CM 1-6-2MM SPC TIP 3.5MM DF CRV

## (undated) DEVICE — PINNACLE INTRODUCER SHEATH: Brand: PINNACLE

## (undated) DEVICE — PATCH REF EXT FOR CARTO 3 SYS (EA = 6 PACKS)

## (undated) DEVICE — CATHETER EP D 2 MM 1 MM 6 FRX92 CM 8 ELECTRD

## (undated) DEVICE — CATHETER EP 6FR L92CM 2-5-2MM SPC TIP 1MM 4 ELECTRD D CRV